# Patient Record
Sex: FEMALE | Race: WHITE
[De-identification: names, ages, dates, MRNs, and addresses within clinical notes are randomized per-mention and may not be internally consistent; named-entity substitution may affect disease eponyms.]

---

## 2017-04-13 ENCOUNTER — HOSPITAL ENCOUNTER (OUTPATIENT)
Dept: HOSPITAL 8 - CFH | Age: 82
Discharge: HOME | End: 2017-04-13
Attending: PAIN MEDICINE
Payer: MEDICARE

## 2017-04-13 DIAGNOSIS — M51.27: ICD-10-CM

## 2017-04-13 DIAGNOSIS — M51.36: Primary | ICD-10-CM

## 2017-04-13 DIAGNOSIS — D18.09: ICD-10-CM

## 2017-04-13 PROCEDURE — 72148 MRI LUMBAR SPINE W/O DYE: CPT

## 2021-01-14 DIAGNOSIS — Z23 NEED FOR VACCINATION: ICD-10-CM

## 2023-06-30 ENCOUNTER — OFFICE VISIT (OUTPATIENT)
Dept: URGENT CARE | Facility: PHYSICIAN GROUP | Age: 88
End: 2023-06-30
Payer: MEDICARE

## 2023-06-30 ENCOUNTER — HOSPITAL ENCOUNTER (OUTPATIENT)
Facility: MEDICAL CENTER | Age: 88
End: 2023-06-30
Attending: PHYSICIAN ASSISTANT
Payer: MEDICARE

## 2023-06-30 VITALS
WEIGHT: 144.2 LBS | TEMPERATURE: 97.3 F | DIASTOLIC BLOOD PRESSURE: 66 MMHG | RESPIRATION RATE: 16 BRPM | SYSTOLIC BLOOD PRESSURE: 126 MMHG | OXYGEN SATURATION: 95 % | HEART RATE: 82 BPM | BODY MASS INDEX: 23.18 KG/M2 | HEIGHT: 66 IN

## 2023-06-30 DIAGNOSIS — N30.00 ACUTE CYSTITIS WITHOUT HEMATURIA: ICD-10-CM

## 2023-06-30 LAB
APPEARANCE UR: CLEAR
BILIRUB UR STRIP-MCNC: NORMAL MG/DL
COLOR UR AUTO: YELLOW
GLUCOSE UR STRIP.AUTO-MCNC: NORMAL MG/DL
KETONES UR STRIP.AUTO-MCNC: NORMAL MG/DL
LEUKOCYTE ESTERASE UR QL STRIP.AUTO: NORMAL
NITRITE UR QL STRIP.AUTO: POSITIVE
PH UR STRIP.AUTO: 5 [PH] (ref 5–8)
PROT UR QL STRIP: NORMAL MG/DL
RBC UR QL AUTO: NORMAL
SP GR UR STRIP.AUTO: 1.01
UROBILINOGEN UR STRIP-MCNC: 0.2 MG/DL

## 2023-06-30 PROCEDURE — 3078F DIAST BP <80 MM HG: CPT | Performed by: PHYSICIAN ASSISTANT

## 2023-06-30 PROCEDURE — 99203 OFFICE O/P NEW LOW 30 MIN: CPT | Performed by: PHYSICIAN ASSISTANT

## 2023-06-30 PROCEDURE — 3074F SYST BP LT 130 MM HG: CPT | Performed by: PHYSICIAN ASSISTANT

## 2023-06-30 PROCEDURE — 87086 URINE CULTURE/COLONY COUNT: CPT

## 2023-06-30 PROCEDURE — 81002 URINALYSIS NONAUTO W/O SCOPE: CPT | Performed by: PHYSICIAN ASSISTANT

## 2023-06-30 PROCEDURE — 87186 SC STD MICRODIL/AGAR DIL: CPT

## 2023-06-30 PROCEDURE — 87077 CULTURE AEROBIC IDENTIFY: CPT

## 2023-06-30 RX ORDER — CEFDINIR 300 MG/1
300 CAPSULE ORAL 2 TIMES DAILY
Qty: 10 CAPSULE | Refills: 0 | Status: SHIPPED
Start: 2023-06-30 | End: 2023-07-05

## 2023-06-30 ASSESSMENT — ENCOUNTER SYMPTOMS
SHORTNESS OF BREATH: 0
COUGH: 0
NAUSEA: 0
VOMITING: 0
FLANK PAIN: 0
BACK PAIN: 0
FEVER: 0
CHILLS: 0
HEADACHES: 0

## 2023-06-30 NOTE — PROGRESS NOTES
"Subjective     Deborah Odonnell is a 89 y.o. female who presents with Painful Urination (X1 day, burning sensation when peeing and when not )            Dysuria   This is a new problem. The current episode started yesterday. The problem occurs every urination. The problem has been unchanged. The quality of the pain is described as burning. The pain is moderate. There has been no fever. Associated symptoms include frequency and urgency. Pertinent negatives include no chills, discharge, flank pain, hematuria, hesitancy, nausea or vomiting. She has tried nothing for the symptoms. There is no history of recurrent UTIs.       Past Medical History:   Diagnosis Date    CHEST PAIN 5/3/2011    Esophageal reflux disease 4/28/2011    Hypothyroidism 5/3/2011    Palpitations 5/25/2011    Post-menopause on HRT (hormone replacement therapy)     SOB (shortness of breath) 5/3/2011    Thyroid disease        Past Surgical History:   Procedure Laterality Date    PTOSIS REPAIR  1/22/2013    Performed by Clement Elliott M.D. at [facility]         Family History   Problem Relation Age of Onset    Heart Attack Sister        Allergies  Tape      Medications, Allergies, and current problem list reviewed today in Epic      Review of Systems   Constitutional:  Negative for chills, fever and malaise/fatigue.   Respiratory:  Negative for cough and shortness of breath.    Cardiovascular:  Negative for chest pain and leg swelling.   Gastrointestinal:  Negative for nausea and vomiting.   Genitourinary:  Positive for dysuria, frequency and urgency. Negative for flank pain, hematuria and hesitancy.   Musculoskeletal:  Negative for back pain.   Neurological:  Negative for headaches.          All other systems reviewed and are negative.         Objective     /66 (BP Location: Left arm, Patient Position: Sitting, BP Cuff Size: Adult)   Pulse 82   Temp 36.3 °C (97.3 °F) (Temporal)   Resp 16   Ht 1.676 m (5' 6\")   Wt 65.4 kg " (144 lb 3.2 oz)   SpO2 95%   BMI 23.27 kg/m²      Physical Exam  Constitutional:       General: She is not in acute distress.     Appearance: She is not ill-appearing.   HENT:      Head: Normocephalic and atraumatic.   Eyes:      Conjunctiva/sclera: Conjunctivae normal.   Cardiovascular:      Rate and Rhythm: Normal rate and regular rhythm.   Pulmonary:      Effort: Pulmonary effort is normal. No respiratory distress.      Breath sounds: No stridor. No wheezing.   Abdominal:      General: There is no distension.      Palpations: Abdomen is soft. There is no mass.      Tenderness: There is abdominal tenderness (mild suprapubic TTP). There is no right CVA tenderness, left CVA tenderness, guarding or rebound.   Skin:     General: Skin is warm and dry.   Neurological:      General: No focal deficit present.      Mental Status: She is alert and oriented to person, place, and time.   Psychiatric:         Mood and Affect: Mood normal.         Behavior: Behavior normal.         Thought Content: Thought content normal.         Judgment: Judgment normal.               UA: positive leuks, positive nitrates, trace blood                 Assessment & Plan        1. Acute cystitis without hematuria  POCT Urinalysis    URINE CULTURE(NEW)    cefdinir (OMNICEF) 300 MG Cap             Current Outpatient Medications:     cefdinir (OMNICEF) 300 MG Cap, Take 1 Capsule by mouth 2 times a day for 5 days., Disp: 10 Capsule, Rfl: 0        Differential diagnoses, Supportive care, and indications for immediate follow-up discussed with patient.   Pathogenesis of diagnosis discussed including typical length and natural progression.   Instructed to return to clinic or nearest emergency department for any change in condition, further concerns, or worsening of symptoms.        The patient demonstrated a good understanding and agreed with the treatment plan.      Maria Fernanda Younger P.A.-C.

## 2023-07-02 DIAGNOSIS — N30.00 ACUTE CYSTITIS WITHOUT HEMATURIA: ICD-10-CM

## 2023-07-02 LAB
BACTERIA UR CULT: ABNORMAL
BACTERIA UR CULT: ABNORMAL
SIGNIFICANT IND 70042: ABNORMAL
SITE SITE: ABNORMAL
SOURCE SOURCE: ABNORMAL

## 2023-07-02 RX ORDER — GRANULES FOR ORAL 3 G/1
3 POWDER ORAL ONCE
Qty: 1 EACH | Refills: 0 | Status: SHIPPED | OUTPATIENT
Start: 2023-07-02 | End: 2023-07-02

## 2023-07-02 NOTE — PROGRESS NOTES
Discussed urine culture with patient. Advised her to stop Cefdinir. Will send in Fosfomycin for ESBL found in urine culture.    The patient demonstrated a good understanding and agreed with the treatment plan.      Maria Fernanda Younger P.A.-C.

## 2023-07-20 ENCOUNTER — APPOINTMENT (OUTPATIENT)
Dept: URGENT CARE | Facility: PHYSICIAN GROUP | Age: 88
End: 2023-07-20
Payer: MEDICARE

## 2023-07-20 ENCOUNTER — HOSPITAL ENCOUNTER (OUTPATIENT)
Facility: MEDICAL CENTER | Age: 88
End: 2023-07-20
Attending: NURSE PRACTITIONER
Payer: MEDICARE

## 2023-07-20 ENCOUNTER — OFFICE VISIT (OUTPATIENT)
Dept: URGENT CARE | Facility: PHYSICIAN GROUP | Age: 88
End: 2023-07-20
Payer: MEDICARE

## 2023-07-20 VITALS
HEART RATE: 95 BPM | TEMPERATURE: 98.4 F | BODY MASS INDEX: 23.14 KG/M2 | OXYGEN SATURATION: 95 % | RESPIRATION RATE: 16 BRPM | HEIGHT: 66 IN | WEIGHT: 144 LBS | SYSTOLIC BLOOD PRESSURE: 102 MMHG | DIASTOLIC BLOOD PRESSURE: 64 MMHG

## 2023-07-20 DIAGNOSIS — N39.0 UTI DUE TO EXTENDED-SPECTRUM BETA LACTAMASE (ESBL) PRODUCING ESCHERICHIA COLI: ICD-10-CM

## 2023-07-20 DIAGNOSIS — Z16.12 UTI DUE TO EXTENDED-SPECTRUM BETA LACTAMASE (ESBL) PRODUCING ESCHERICHIA COLI: ICD-10-CM

## 2023-07-20 DIAGNOSIS — B96.29 UTI DUE TO EXTENDED-SPECTRUM BETA LACTAMASE (ESBL) PRODUCING ESCHERICHIA COLI: ICD-10-CM

## 2023-07-20 DIAGNOSIS — R39.9 LOWER URINARY TRACT SYMPTOMS: ICD-10-CM

## 2023-07-20 LAB
APPEARANCE UR: CLEAR
BILIRUB UR STRIP-MCNC: NEGATIVE MG/DL
COLOR UR AUTO: YELLOW
GLUCOSE UR STRIP.AUTO-MCNC: NEGATIVE MG/DL
KETONES UR STRIP.AUTO-MCNC: NEGATIVE MG/DL
LEUKOCYTE ESTERASE UR QL STRIP.AUTO: NORMAL
NITRITE UR QL STRIP.AUTO: NEGATIVE
PH UR STRIP.AUTO: 5.5 [PH] (ref 5–8)
PROT UR QL STRIP: NEGATIVE MG/DL
RBC UR QL AUTO: NORMAL
SP GR UR STRIP.AUTO: 1
UROBILINOGEN UR STRIP-MCNC: 0.2 MG/DL

## 2023-07-20 PROCEDURE — 99214 OFFICE O/P EST MOD 30 MIN: CPT | Performed by: NURSE PRACTITIONER

## 2023-07-20 PROCEDURE — 3074F SYST BP LT 130 MM HG: CPT | Performed by: NURSE PRACTITIONER

## 2023-07-20 PROCEDURE — 87086 URINE CULTURE/COLONY COUNT: CPT

## 2023-07-20 PROCEDURE — 81002 URINALYSIS NONAUTO W/O SCOPE: CPT | Performed by: NURSE PRACTITIONER

## 2023-07-20 PROCEDURE — 87077 CULTURE AEROBIC IDENTIFY: CPT

## 2023-07-20 PROCEDURE — 87186 SC STD MICRODIL/AGAR DIL: CPT

## 2023-07-20 PROCEDURE — 3078F DIAST BP <80 MM HG: CPT | Performed by: NURSE PRACTITIONER

## 2023-07-20 RX ORDER — SULFAMETHOXAZOLE AND TRIMETHOPRIM 800; 160 MG/1; MG/1
1 TABLET ORAL 2 TIMES DAILY
Qty: 10 TABLET | Refills: 0 | Status: SHIPPED | OUTPATIENT
Start: 2023-07-20 | End: 2023-07-25

## 2023-07-20 ASSESSMENT — ENCOUNTER SYMPTOMS
CHILLS: 0
VOMITING: 0
ORTHOPNEA: 0
HEADACHES: 0
NAUSEA: 0
DIZZINESS: 0
DIARRHEA: 0
FLANK PAIN: 0
BACK PAIN: 0
ABDOMINAL PAIN: 1
FEVER: 0

## 2023-07-20 NOTE — PROGRESS NOTES
Subjective     Deborah Odonnell is a 89 y.o. female who presents with UTI (Started yesterday )            HPI  Recurrent problem.  Patient is an 89-year-old female who presents with lower abdominal discomfort.  She reports that she was treated for UTI at the end of June and feels like it has never completely gone away but she has continued to have some lower abdominal pressure and pain.  She denies back pain, fever, chills.  She denies blood in her urine.  She had a culture that grew out ESBL E. coli and was changed from cefdinir to fosfomycin.    Tape  Current Outpatient Medications on File Prior to Visit   Medication Sig Dispense Refill    levothyroxine (SYNTHROID) 125 MCG TABS Take 125 mcg by mouth every day.      methylPREDNISolone (MEDROL DOSPACK) 4 MG TABS Take 4 mg by mouth every day. (Patient not taking: Reported on 6/30/2023)      Estradiol 0.025 MG/24HR PTTW Apply 1 Patch to skin as directed 2 times a week. (Patient not taking: Reported on 7/20/2023)      tramadol (ULTRAM) 50 MG TABS Take 1 Tab by mouth every 6 hours as needed for Mild Pain or Moderate Pain. (Patient not taking: Reported on 6/30/2023) 25 Tab 0    KRILL OIL PO Take  by mouth. (Patient not taking: Reported on 6/30/2023)      Biotin 5 MG TABS Take  by mouth. (Patient not taking: Reported on 6/30/2023)      Glucos-Chond-Sterol-Fish Oil (GLUCOSAMINE CHONDROITIN PLUS) CAPS Take  by mouth. (Patient not taking: Reported on 6/30/2023)      LUTEIN PO Take  by mouth. (Patient not taking: Reported on 6/30/2023)      Cholecalciferol (VITAMIN D3) 5000 UNITS TABS Take  by mouth. (Patient not taking: Reported on 6/30/2023)      magnesium citrate SOLN Take 300 mL by mouth Once. (Patient not taking: Reported on 6/30/2023)      Estradiol Valerate (DELESTROGEN) 20 MG/ML OIL 1 mL by Intramuscular route. Every 4 weeks.  (Patient not taking: Reported on 6/30/2023)      folic acid (FOLVITE) 800 MCG tablet Take  by mouth. Take 4 tablets daily.  (Patient not taking:  "Reported on 6/30/2023)      Cyanocobalamin (VITAMIN B-12) 1000 MCG TABS Take 1,000 mcg by mouth every day. Take 3 tablets (3000 mg) daily.  (Patient not taking: Reported on 6/30/2023)      Multiple Vitamin (MULTIVITAMINS PO) Take  by mouth. (Patient not taking: Reported on 7/20/2023)       No current facility-administered medications on file prior to visit.     Social History     Socioeconomic History    Marital status:      Spouse name: Not on file    Number of children: Not on file    Years of education: Not on file    Highest education level: Not on file   Occupational History    Not on file   Tobacco Use    Smoking status: Never    Smokeless tobacco: Not on file   Substance and Sexual Activity    Alcohol use: No    Drug use: No    Sexual activity: Not on file   Other Topics Concern    Not on file   Social History Narrative    Not on file     Social Determinants of Health     Financial Resource Strain: Not on file   Food Insecurity: Not on file   Transportation Needs: Not on file   Physical Activity: Not on file   Stress: Not on file   Social Connections: Not on file   Intimate Partner Violence: Not on file   Housing Stability: Not on file     Breast Cancer-related family history is not on file.      Review of Systems   Constitutional:  Negative for chills and fever.   Cardiovascular:  Negative for chest pain and orthopnea.   Gastrointestinal:  Positive for abdominal pain. Negative for diarrhea, nausea and vomiting.   Genitourinary:  Negative for dysuria, flank pain, frequency, hematuria and urgency.   Musculoskeletal:  Negative for back pain.   Neurological:  Negative for dizziness and headaches.              Objective     /64 (BP Location: Right arm, Patient Position: Sitting, BP Cuff Size: Adult)   Pulse 95   Temp 36.9 °C (98.4 °F) (Temporal)   Resp 16   Ht 1.676 m (5' 6\")   Wt 65.3 kg (144 lb)   SpO2 95%   BMI 23.24 kg/m²      Physical Exam  Vitals reviewed.   Constitutional:       " General: She is not in acute distress.     Appearance: She is well-developed.   Cardiovascular:      Rate and Rhythm: Normal rate and regular rhythm.      Heart sounds: Normal heart sounds. No murmur heard.  Pulmonary:      Effort: Pulmonary effort is normal. No respiratory distress.      Breath sounds: Normal breath sounds.   Abdominal:      General: Bowel sounds are normal.      Palpations: Abdomen is soft.      Tenderness: There is abdominal tenderness in the suprapubic area. There is no right CVA tenderness or left CVA tenderness.   Musculoskeletal:         General: Normal range of motion.      Comments: Moves all 4 extremities normally   Skin:     General: Skin is warm and dry.   Neurological:      Mental Status: She is alert and oriented to person, place, and time.   Psychiatric:         Behavior: Behavior normal.         Thought Content: Thought content normal.                             Assessment & Plan        1. UTI due to extended-spectrum beta lactamase (ESBL) producing Escherichia coli  sulfamethoxazole-trimethoprim (BACTRIM DS) 800-160 MG tablet      2. Lower urinary tract symptoms  POCT Urinalysis    URINE CULTURE(NEW)      Will call with culture results.  Bactrim.  Differential diagnosis, natural history, supportive care, and indications for immediate follow-up were discussed.

## 2023-07-21 DIAGNOSIS — R39.9 LOWER URINARY TRACT SYMPTOMS: ICD-10-CM

## 2023-08-02 ENCOUNTER — OFFICE VISIT (OUTPATIENT)
Dept: URGENT CARE | Facility: PHYSICIAN GROUP | Age: 88
End: 2023-08-02
Payer: MEDICARE

## 2023-08-02 ENCOUNTER — HOSPITAL ENCOUNTER (OUTPATIENT)
Facility: MEDICAL CENTER | Age: 88
End: 2023-08-02
Payer: MEDICARE

## 2023-08-02 VITALS
BODY MASS INDEX: 23.49 KG/M2 | OXYGEN SATURATION: 94 % | DIASTOLIC BLOOD PRESSURE: 52 MMHG | HEART RATE: 87 BPM | HEIGHT: 65 IN | RESPIRATION RATE: 18 BRPM | WEIGHT: 141 LBS | SYSTOLIC BLOOD PRESSURE: 134 MMHG | TEMPERATURE: 97.6 F

## 2023-08-02 DIAGNOSIS — N30.01 ACUTE CYSTITIS WITH HEMATURIA: ICD-10-CM

## 2023-08-02 LAB
APPEARANCE UR: NORMAL
BILIRUB UR STRIP-MCNC: NORMAL MG/DL
COLOR UR AUTO: NORMAL
GLUCOSE UR STRIP.AUTO-MCNC: 250 MG/DL
KETONES UR STRIP.AUTO-MCNC: 15 MG/DL
LEUKOCYTE ESTERASE UR QL STRIP.AUTO: NORMAL
NITRITE UR QL STRIP.AUTO: POSITIVE
PH UR STRIP.AUTO: 5 [PH] (ref 5–8)
PROT UR QL STRIP: 300 MG/DL
RBC UR QL AUTO: NORMAL
SP GR UR STRIP.AUTO: 1
UROBILINOGEN UR STRIP-MCNC: 4 MG/DL

## 2023-08-02 PROCEDURE — 87077 CULTURE AEROBIC IDENTIFY: CPT | Mod: 91

## 2023-08-02 PROCEDURE — 3075F SYST BP GE 130 - 139MM HG: CPT

## 2023-08-02 PROCEDURE — 87186 SC STD MICRODIL/AGAR DIL: CPT

## 2023-08-02 PROCEDURE — 99214 OFFICE O/P EST MOD 30 MIN: CPT

## 2023-08-02 PROCEDURE — 81002 URINALYSIS NONAUTO W/O SCOPE: CPT

## 2023-08-02 PROCEDURE — 3078F DIAST BP <80 MM HG: CPT

## 2023-08-02 PROCEDURE — 87086 URINE CULTURE/COLONY COUNT: CPT

## 2023-08-02 RX ORDER — SULFAMETHOXAZOLE AND TRIMETHOPRIM 800; 160 MG/1; MG/1
1 TABLET ORAL 2 TIMES DAILY
Qty: 14 TABLET | Refills: 0 | Status: SHIPPED | OUTPATIENT
Start: 2023-08-02 | End: 2023-08-09

## 2023-08-03 DIAGNOSIS — N30.01 ACUTE CYSTITIS WITH HEMATURIA: ICD-10-CM

## 2023-08-03 NOTE — PROGRESS NOTES
"Chief Complaint   Patient presents with    UTI     Burning,x2 days         Subjective:   HISTORY OF PRESENT ILLNESS: Deborah Odonnell is a 89 y.o. female who presents for dysuria x 2 days, this appears to be her 3rd UTI in less than 2 months.  It is unclear whether her symptoms had fully resolved between each episode.  Her last culture grew out Klebsiella pneumonia which was sensitive to Bactrim which she was placed on.  She reports this multiple UTI's has happened to her in the past and she used an estrogen vaginal cream and it lessened her episodes.       Medications, Allergies, current problem list, Social and Family history reviewed today in Epic.     Objective:     /52 (BP Location: Right arm, Patient Position: Sitting, BP Cuff Size: Adult)   Pulse 87   Temp 36.4 °C (97.6 °F) (Temporal)   Resp 18   Ht 1.651 m (5' 5\")   Wt 64 kg (141 lb)   SpO2 94%     Physical Exam  Vitals reviewed.   Constitutional:       Appearance: Normal appearance.   HENT:      Mouth/Throat:      Mouth: Mucous membranes are moist.   Cardiovascular:      Rate and Rhythm: Normal rate.   Pulmonary:      Effort: Pulmonary effort is normal.   Abdominal:      Tenderness: There is no abdominal tenderness. There is no right CVA tenderness, left CVA tenderness, guarding or rebound.   Skin:     General: Skin is warm and dry.   Neurological:      Mental Status: She is alert and oriented to person, place, and time.   Psychiatric:         Mood and Affect: Mood normal.          Assessment/Plan:     Diagnosis and associated orders    1. Acute cystitis with hematuria  sulfamethoxazole-trimethoprim (BACTRIM DS) 800-160 MG tablet    estrogens, conjugated (Premarin) 0.625 MG/GM vaginal cream 1 Applicator    POCT Urinalysis    URINE CULTURE(NEW)    Referral to Urogynecology          I personally reviewed prior external notes and test results pertinent to today's visit.       IMPRESSION: Patient is non-toxic appearing and suitable for outpatient " care at this time.  Symptoms and exam are consistent with re-current UTI.   Exam findings reassuring with no flank pain, n/v, fevers.  We will treat with bactrim and culture her urine.  I have given her some vaginal estrogen cream and a referral to uro-gyn to evaluate her symptoms    Educated on red flag symptoms and Instructed patient to return to Urgent Care or nearest Emergency Department if symptoms fail to improve, for any change in condition, further concerns, or new concerning symptoms. Patient states understanding of the plan of care and discharge instructions.        Please note that this dictation was created using voice recognition software. I have made a reasonable attempt to correct obvious errors, but I expect that there are errors of grammar and possibly content that I did not discover before finalizing the note.    This note was electronically signed by CONSTANZA Oneill

## 2023-08-05 LAB
BACTERIA UR CULT: ABNORMAL
SIGNIFICANT IND 70042: ABNORMAL
SITE SITE: ABNORMAL
SOURCE SOURCE: ABNORMAL

## 2023-08-05 RX ORDER — CEFUROXIME AXETIL 250 MG/1
250 TABLET ORAL 2 TIMES DAILY
Qty: 14 TABLET | Refills: 0 | Status: SHIPPED | OUTPATIENT
Start: 2023-08-05 | End: 2023-08-12

## 2023-08-28 ENCOUNTER — HOSPITAL ENCOUNTER (OUTPATIENT)
Facility: MEDICAL CENTER | Age: 88
End: 2023-08-28
Attending: OBSTETRICS & GYNECOLOGY
Payer: MEDICARE

## 2023-08-28 ENCOUNTER — GYNECOLOGY VISIT (OUTPATIENT)
Dept: OBGYN | Facility: CLINIC | Age: 88
End: 2023-08-28
Payer: MEDICARE

## 2023-08-28 VITALS — BODY MASS INDEX: 23.45 KG/M2 | SYSTOLIC BLOOD PRESSURE: 116 MMHG | DIASTOLIC BLOOD PRESSURE: 58 MMHG | WEIGHT: 140.9 LBS

## 2023-08-28 DIAGNOSIS — N39.0 RECURRENT UTI: ICD-10-CM

## 2023-08-28 DIAGNOSIS — N39.0 RECURRENT UTI: Primary | ICD-10-CM

## 2023-08-28 LAB
APPEARANCE UR: NORMAL
BILIRUB UR STRIP-MCNC: NORMAL MG/DL
COLOR UR AUTO: NORMAL
GLUCOSE UR STRIP.AUTO-MCNC: 100 MG/DL
KETONES UR STRIP.AUTO-MCNC: NORMAL MG/DL
LEUKOCYTE ESTERASE UR QL STRIP.AUTO: NORMAL
NITRITE UR QL STRIP.AUTO: NORMAL
PH UR STRIP.AUTO: 5.5 [PH] (ref 5–8)
PROT UR QL STRIP: NORMAL MG/DL
RBC UR QL AUTO: NORMAL
SP GR UR STRIP.AUTO: 1.01
UROBILINOGEN UR STRIP-MCNC: 1 MG/DL

## 2023-08-28 PROCEDURE — 87086 URINE CULTURE/COLONY COUNT: CPT

## 2023-08-28 PROCEDURE — 99204 OFFICE O/P NEW MOD 45 MIN: CPT | Performed by: OBSTETRICS & GYNECOLOGY

## 2023-08-28 PROCEDURE — 87186 SC STD MICRODIL/AGAR DIL: CPT

## 2023-08-28 PROCEDURE — 3078F DIAST BP <80 MM HG: CPT | Performed by: OBSTETRICS & GYNECOLOGY

## 2023-08-28 PROCEDURE — 81002 URINALYSIS NONAUTO W/O SCOPE: CPT | Performed by: OBSTETRICS & GYNECOLOGY

## 2023-08-28 PROCEDURE — 87077 CULTURE AEROBIC IDENTIFY: CPT

## 2023-08-28 PROCEDURE — 3074F SYST BP LT 130 MM HG: CPT | Performed by: OBSTETRICS & GYNECOLOGY

## 2023-08-28 RX ORDER — ESTRADIOL 0.03 MG/D
1 FILM, EXTENDED RELEASE TRANSDERMAL
COMMUNITY

## 2023-08-28 RX ORDER — ESTRADIOL 0.1 MG/G
CREAM VAGINAL
Qty: 42.5 G | Refills: 2 | Status: SHIPPED | OUTPATIENT
Start: 2023-08-28

## 2023-08-28 RX ORDER — LEVOTHYROXINE SODIUM 0.1 MG/1
100 TABLET ORAL
COMMUNITY

## 2023-08-28 RX ORDER — SULFAMETHOXAZOLE AND TRIMETHOPRIM 800; 160 MG/1; MG/1
1 TABLET ORAL 2 TIMES DAILY
Qty: 20 TABLET | Refills: 0 | Status: SHIPPED | OUTPATIENT
Start: 2023-08-28 | End: 2023-09-07

## 2023-08-28 NOTE — PROGRESS NOTES
New GYN Problem Note    CC:   Cystitis (Ongoing for x2 months, tried Fosmycin, cefuroxine, cefdinir, sulfameth. With no resolution.)      HPI:   Patient is a 89 y.o.  who presents complaining of recurrent UTIs over the past two months. She recently moved back to [place] so has just been going to Urgent Care and I was the first provider she was able to get in with.  Since  she has had 3 positive urine cultures at Urgent care with 3 different isolates  - ESBL 1st, Klebsiella 2nd and Klebsiella + Strep anginosus the 3rd time.  She has taken bactrim, fosamycin, cefuroxime  and cefdinir and completed each treatment. She states it will feel like its getting better but then returns immediately after stopping medication. She has not changed her hygiene practices and states she washes with water after any bowel movement.  She is still on an estradiol patch which she oddly has been all these years despite age after being told that she was unusually estrogen deficient after hysterectomy at age 27 (reportedly without BSO).  She has used premarin cream in the past when she had a similar bout of UTIs and was given this by a urologist but it is so expensive she stopped use.        GYNECOLOGIC HISTORY:   Abnormal discharge? No   Hysterectomy: age 27  Denies osteoporosis       Sexually active:    Social History     Substance and Sexual Activity   Sexual Activity Not Currently       OBSTETRIC HISTORY:  OB History    Para Term  AB Living   3 3 2 1       SAB IAB Ectopic Molar Multiple Live Births                    # Outcome Date GA Lbr Flaco/2nd Weight Sex Delivery Anes PTL Lv   3 Term 68 40w0d   M Vag-Spont      2 Term 67 40w0d   F Vag-Spont      1  65 36w0d   F Vag-Spont          MEDICAL HISTORY:  Past Medical History:   Diagnosis Date    CHEST PAIN 5/3/2011    Esophageal reflux disease 2011    Hypothyroidism 5/3/2011    Palpitations 2011    Post-menopause on HRT (hormone  replacement therapy)     SOB (shortness of breath) 5/3/2011    Thyroid disease        SURGICAL HISTORY:  Past Surgical History:   Procedure Laterality Date    PTOSIS REPAIR  2013    Performed by Clement Elliott M.D. at [facility]       FAMILY HISTORY:  Family History   Problem Relation Age of Onset    Heart Attack Sister        ALLERGIES / REACTIONS:  Allergies   Allergen Reactions    Tape        SOCIAL HISTORY:   reports that she has never smoked. She has never been exposed to tobacco smoke. She has never used smokeless tobacco. She reports that she does not drink alcohol and does not use drugs.    ROS:   Positive ROS: suprapubic pain  Gen: no fevers or chills, no significant weight loss or gain, excessive fatigue  Respiratory:  no cough or dyspnea  Cardiac:  no chest pain, no palpitations, no syncope  Breast: no breast discharge, pain, lump or skin changes  GI:  no heartburn, no abdominal pain, no nausea or vomiting  Urinary: no dysuria, urgency, frequency, incontinence   Psych: no depression or anxiety  Neuro: no migraines with aura, fainting spells, numbness or tingling  Extremities: no joint pain, persistently swollen ankles, recurrent leg cramps       PHYSICAL EXAMINATION:  Vital Signs:   Vitals:    23 1046   BP: 116/58   Weight: 140 lb 14.4 oz     Body mass index is 23.45 kg/m².  Constitutional: The patient is well developed and well nourished.  Psychiatric: Patient is oriented to time place and person.   Skin: No rash observed.  Neck: Neck appears symmetric.  Respiratory: normal effort  Abdomen: Soft, non-tender.  Extremeties: Legs are symmetric     ASSESSMENT AND PLAN:  89 y.o.       1. Recurrent UTI   - discussed that from gyn perspective, local estrogen cream can help with recurrent UTIs.  Also discussed that is is very abnormal for her to be on estrogen patch at this age but this seems her norm so I did not really discourage use or address this given she is 89 and has  used it without complications to this age   - discussed that estrace cream is sometimes cheaper/better covered than premarin   - may use cream daily x 2wks and then x week     - UA with +nit   - will send for culture   - review of past 3 cultures show 3 different organisms each sensitive to 2 of the 3 most commonly used abx and resistant to 1 primary antibiotic each/  She has been treated appropriately each time so why she keeps getting a new infection is unclear to me.  I will go ahead and start extended use of bactrim which covers all but 1 of the recent bacteria and await this next culture and switch if resistant.     - refer to urology for further evaluation and treatment as beyond local estrogen therapy this is out of my scope due to complicated UTIs due to bacteria and age and risk for severe illness    - estradiol (ESTRACE) 0.1 MG/GM vaginal cream; Use 1g per vagina nightly x 2 weeks then continue use 2x per week  Dispense: 42.5 g; Refill: 2  - URINE CULTURE(NEW); Future  - sulfamethoxazole-trimethoprim (BACTRIM DS) 800-160 MG tablet; Take 1 Tablet by mouth 2 times a day for 10 days.  Dispense: 20 Tablet; Refill: 0  - Referral to Urology - STAT        Sowmya Naqvi D.O.

## 2023-08-31 ENCOUNTER — TELEPHONE (OUTPATIENT)
Dept: OBGYN | Facility: CLINIC | Age: 88
End: 2023-08-31
Payer: MEDICARE

## 2023-08-31 DIAGNOSIS — E03.9 HYPOTHYROIDISM, UNSPECIFIED TYPE: ICD-10-CM

## 2023-08-31 DIAGNOSIS — N39.0 COMPLICATED UTI (URINARY TRACT INFECTION): Primary | ICD-10-CM

## 2023-08-31 RX ORDER — AMOXICILLIN AND CLAVULANATE POTASSIUM 875; 125 MG/1; MG/1
1 TABLET, FILM COATED ORAL 2 TIMES DAILY
Qty: 20 TABLET | Refills: 0 | Status: SHIPPED | OUTPATIENT
Start: 2023-08-31 | End: 2023-09-10

## 2023-09-01 ENCOUNTER — HOSPITAL ENCOUNTER (OUTPATIENT)
Dept: LAB | Facility: MEDICAL CENTER | Age: 88
End: 2023-09-01
Attending: OBSTETRICS & GYNECOLOGY
Payer: MEDICARE

## 2023-09-01 DIAGNOSIS — E03.9 HYPOTHYROIDISM, UNSPECIFIED TYPE: ICD-10-CM

## 2023-09-01 DIAGNOSIS — N39.0 COMPLICATED UTI (URINARY TRACT INFECTION): ICD-10-CM

## 2023-09-01 LAB
ANION GAP SERPL CALC-SCNC: 9 MMOL/L (ref 7–16)
BUN SERPL-MCNC: 18 MG/DL (ref 8–22)
CALCIUM SERPL-MCNC: 9.4 MG/DL (ref 8.5–10.5)
CHLORIDE SERPL-SCNC: 104 MMOL/L (ref 96–112)
CO2 SERPL-SCNC: 24 MMOL/L (ref 20–33)
CREAT SERPL-MCNC: 1.33 MG/DL (ref 0.5–1.4)
GFR SERPLBLD CREATININE-BSD FMLA CKD-EPI: 38 ML/MIN/1.73 M 2
GLUCOSE SERPL-MCNC: 99 MG/DL (ref 65–99)
POTASSIUM SERPL-SCNC: 4.8 MMOL/L (ref 3.6–5.5)
SODIUM SERPL-SCNC: 137 MMOL/L (ref 135–145)
T4 FREE SERPL-MCNC: 1.66 NG/DL (ref 0.93–1.7)
TSH SERPL DL<=0.005 MIU/L-ACNC: 0.54 UIU/ML (ref 0.38–5.33)

## 2023-09-01 PROCEDURE — 84439 ASSAY OF FREE THYROXINE: CPT

## 2023-09-01 PROCEDURE — 84443 ASSAY THYROID STIM HORMONE: CPT

## 2023-09-01 PROCEDURE — 80048 BASIC METABOLIC PNL TOTAL CA: CPT

## 2023-09-01 PROCEDURE — 36415 COLL VENOUS BLD VENIPUNCTURE: CPT

## 2023-09-01 NOTE — TELEPHONE ENCOUNTER
Called pt to inform her of next urine culture growing out 1 of 2 same as last urine culture of strep anginosus.  Discussed with micro as sensitivities not sent and would be send out lab.  Also not typical for uti.  Discussed with clinical pharmacist and they recommend penicillin based treatment.  We agreed upon pt stopping bactrim and starting Augmentin.  She has made urology appt but pushed it off to see if UTI with clear with the 2 wks antibiotic I gave her.  She felt better yesterday but has lower abd pain today.    Discussed referral to ID for them to review as well.    Will obtain BMP to check kidney function and pt also states she needs thyroid labs checked.      Orders placed for labs and referral and new abx.

## 2023-11-10 ENCOUNTER — HOSPITAL ENCOUNTER (OUTPATIENT)
Facility: MEDICAL CENTER | Age: 88
End: 2023-11-10
Attending: PHYSICIAN ASSISTANT
Payer: MEDICARE

## 2023-11-10 PROCEDURE — 87077 CULTURE AEROBIC IDENTIFY: CPT

## 2023-11-10 PROCEDURE — 87086 URINE CULTURE/COLONY COUNT: CPT

## 2023-11-12 LAB
BACTERIA UR CULT: NORMAL
SIGNIFICANT IND 70042: NORMAL
SITE SITE: NORMAL
SOURCE SOURCE: NORMAL

## 2023-11-15 ENCOUNTER — HOSPITAL ENCOUNTER (OUTPATIENT)
Dept: LAB | Facility: MEDICAL CENTER | Age: 88
End: 2023-11-15
Payer: MEDICARE

## 2023-11-15 LAB
25(OH)D3 SERPL-MCNC: 57 NG/ML (ref 30–100)
ALBUMIN SERPL BCP-MCNC: 3.9 G/DL (ref 3.2–4.9)
ALBUMIN/GLOB SERPL: 1.3 G/DL
ALP SERPL-CCNC: 78 U/L (ref 30–99)
ALT SERPL-CCNC: 13 U/L (ref 2–50)
ANION GAP SERPL CALC-SCNC: 10 MMOL/L (ref 7–16)
AST SERPL-CCNC: 23 U/L (ref 12–45)
BASOPHILS # BLD AUTO: 0.6 % (ref 0–1.8)
BASOPHILS # BLD: 0.04 K/UL (ref 0–0.12)
BILIRUB SERPL-MCNC: 0.5 MG/DL (ref 0.1–1.5)
BUN SERPL-MCNC: 20 MG/DL (ref 8–22)
CALCIUM ALBUM COR SERPL-MCNC: 9.7 MG/DL (ref 8.5–10.5)
CALCIUM SERPL-MCNC: 9.6 MG/DL (ref 8.5–10.5)
CHLORIDE SERPL-SCNC: 105 MMOL/L (ref 96–112)
CHOLEST SERPL-MCNC: 224 MG/DL (ref 100–199)
CO2 SERPL-SCNC: 25 MMOL/L (ref 20–33)
CREAT SERPL-MCNC: 1.01 MG/DL (ref 0.5–1.4)
EOSINOPHIL # BLD AUTO: 0.07 K/UL (ref 0–0.51)
EOSINOPHIL NFR BLD: 1 % (ref 0–6.9)
ERYTHROCYTE [DISTWIDTH] IN BLOOD BY AUTOMATED COUNT: 44.2 FL (ref 35.9–50)
GFR SERPLBLD CREATININE-BSD FMLA CKD-EPI: 53 ML/MIN/1.73 M 2
GLOBULIN SER CALC-MCNC: 3 G/DL (ref 1.9–3.5)
GLUCOSE SERPL-MCNC: 97 MG/DL (ref 65–99)
HCT VFR BLD AUTO: 47 % (ref 37–47)
HDLC SERPL-MCNC: 73 MG/DL
HGB BLD-MCNC: 14.8 G/DL (ref 12–16)
IMM GRANULOCYTES # BLD AUTO: 0.01 K/UL (ref 0–0.11)
IMM GRANULOCYTES NFR BLD AUTO: 0.1 % (ref 0–0.9)
LDLC SERPL CALC-MCNC: 135 MG/DL
LYMPHOCYTES # BLD AUTO: 1.89 K/UL (ref 1–4.8)
LYMPHOCYTES NFR BLD: 27.2 % (ref 22–41)
MCH RBC QN AUTO: 28 PG (ref 27–33)
MCHC RBC AUTO-ENTMCNC: 31.5 G/DL (ref 32.2–35.5)
MCV RBC AUTO: 89 FL (ref 81.4–97.8)
MONOCYTES # BLD AUTO: 0.57 K/UL (ref 0–0.85)
MONOCYTES NFR BLD AUTO: 8.2 % (ref 0–13.4)
NEUTROPHILS # BLD AUTO: 4.36 K/UL (ref 1.82–7.42)
NEUTROPHILS NFR BLD: 62.9 % (ref 44–72)
NRBC # BLD AUTO: 0 K/UL
NRBC BLD-RTO: 0 /100 WBC (ref 0–0.2)
PLATELET # BLD AUTO: 328 K/UL (ref 164–446)
PMV BLD AUTO: 10.3 FL (ref 9–12.9)
POTASSIUM SERPL-SCNC: 4.6 MMOL/L (ref 3.6–5.5)
PROT SERPL-MCNC: 6.9 G/DL (ref 6–8.2)
RBC # BLD AUTO: 5.28 M/UL (ref 4.2–5.4)
SODIUM SERPL-SCNC: 140 MMOL/L (ref 135–145)
T3FREE SERPL-MCNC: 1.98 PG/ML (ref 2–4.4)
T4 FREE SERPL-MCNC: 1.29 NG/DL (ref 0.93–1.7)
TRIGL SERPL-MCNC: 78 MG/DL (ref 0–149)
TSH SERPL DL<=0.005 MIU/L-ACNC: 1.35 UIU/ML (ref 0.38–5.33)
URATE SERPL-MCNC: 5.7 MG/DL (ref 1.9–8.2)
WBC # BLD AUTO: 6.9 K/UL (ref 4.8–10.8)

## 2023-11-15 PROCEDURE — 84439 ASSAY OF FREE THYROXINE: CPT

## 2023-11-15 PROCEDURE — 80053 COMPREHEN METABOLIC PANEL: CPT

## 2023-11-15 PROCEDURE — 84443 ASSAY THYROID STIM HORMONE: CPT

## 2023-11-15 PROCEDURE — 82306 VITAMIN D 25 HYDROXY: CPT

## 2023-11-15 PROCEDURE — 36415 COLL VENOUS BLD VENIPUNCTURE: CPT

## 2023-11-15 PROCEDURE — 80061 LIPID PANEL: CPT

## 2023-11-15 PROCEDURE — 84481 FREE ASSAY (FT-3): CPT

## 2023-11-15 PROCEDURE — 86376 MICROSOMAL ANTIBODY EACH: CPT

## 2023-11-15 PROCEDURE — 84550 ASSAY OF BLOOD/URIC ACID: CPT

## 2023-11-15 PROCEDURE — 86800 THYROGLOBULIN ANTIBODY: CPT

## 2023-11-15 PROCEDURE — 85025 COMPLETE CBC W/AUTO DIFF WBC: CPT

## 2023-11-17 LAB
THYROGLOB AB SERPL-ACNC: <0.9 IU/ML (ref 0–4)
THYROPEROXIDASE AB SERPL-ACNC: 1.6 IU/ML (ref 0–9)

## 2024-07-03 ENCOUNTER — HOSPITAL ENCOUNTER (OUTPATIENT)
Facility: MEDICAL CENTER | Age: 89
End: 2024-07-03
Attending: PHYSICIAN ASSISTANT
Payer: MEDICARE

## 2024-07-03 PROCEDURE — 87086 URINE CULTURE/COLONY COUNT: CPT

## 2024-07-06 LAB
BACTERIA UR CULT: NORMAL
SIGNIFICANT IND 70042: NORMAL
SITE SITE: NORMAL
SOURCE SOURCE: NORMAL

## 2024-07-16 ENCOUNTER — APPOINTMENT (RX ONLY)
Dept: URBAN - METROPOLITAN AREA CLINIC 22 | Facility: CLINIC | Age: 89
Setting detail: DERMATOLOGY
End: 2024-07-16

## 2024-07-16 DIAGNOSIS — Z71.89 OTHER SPECIFIED COUNSELING: ICD-10-CM

## 2024-07-16 DIAGNOSIS — L66.11 CLASSIC LICHEN PLANOPILARIS: ICD-10-CM

## 2024-07-16 DIAGNOSIS — L82.0 INFLAMED SEBORRHEIC KERATOSIS: ICD-10-CM

## 2024-07-16 DIAGNOSIS — L66.1 LICHEN PLANOPILARIS: ICD-10-CM

## 2024-07-16 DIAGNOSIS — L57.0 ACTINIC KERATOSIS: ICD-10-CM

## 2024-07-16 DIAGNOSIS — L81.4 OTHER MELANIN HYPERPIGMENTATION: ICD-10-CM

## 2024-07-16 PROCEDURE — 17000 DESTRUCT PREMALG LESION: CPT | Mod: 59

## 2024-07-16 PROCEDURE — 99204 OFFICE O/P NEW MOD 45 MIN: CPT | Mod: 25

## 2024-07-16 PROCEDURE — ? MEDICATION COUNSELING

## 2024-07-16 PROCEDURE — ? PRESCRIPTION

## 2024-07-16 PROCEDURE — ? LIQUID NITROGEN

## 2024-07-16 PROCEDURE — ? ADDITIONAL NOTES

## 2024-07-16 PROCEDURE — 17003 DESTRUCT PREMALG LES 2-14: CPT | Mod: 59

## 2024-07-16 PROCEDURE — ? COUNSELING

## 2024-07-16 PROCEDURE — 17110 DESTRUCTION B9 LES UP TO 14: CPT

## 2024-07-16 RX ORDER — CLOBETASOL PROPIONATE 0.5 MG/ML
SOLUTION TOPICAL QHS
Qty: 50 | Refills: 2 | COMMUNITY
Start: 2024-07-16

## 2024-07-16 RX ADMIN — CLOBETASOL PROPIONATE: 0.5 SOLUTION TOPICAL at 00:00

## 2024-07-16 ASSESSMENT — LOCATION DETAILED DESCRIPTION DERM
LOCATION DETAILED: RIGHT PROXIMAL RADIAL DORSAL FOREARM
LOCATION DETAILED: LEFT ELBOW
LOCATION DETAILED: LEFT PROXIMAL DORSAL FOREARM
LOCATION DETAILED: RIGHT DORSAL WRIST
LOCATION DETAILED: LEFT INFERIOR MEDIAL FOREHEAD
LOCATION DETAILED: LEFT DISTAL DORSAL FOREARM
LOCATION DETAILED: RIGHT LATERAL DORSAL WRIST
LOCATION DETAILED: RIGHT DISTAL RADIAL DORSAL FOREARM
LOCATION DETAILED: RIGHT DISTAL DORSAL FOREARM
LOCATION DETAILED: LEFT FOREHEAD
LOCATION DETAILED: RIGHT SUPERIOR PARIETAL SCALP
LOCATION DETAILED: RIGHT SUPERIOR MEDIAL FOREHEAD
LOCATION DETAILED: RIGHT PROXIMAL DORSAL FOREARM

## 2024-07-16 ASSESSMENT — LOCATION SIMPLE DESCRIPTION DERM
LOCATION SIMPLE: LEFT FOREHEAD
LOCATION SIMPLE: RIGHT WRIST
LOCATION SIMPLE: RIGHT FOREARM
LOCATION SIMPLE: SCALP
LOCATION SIMPLE: LEFT FOREARM
LOCATION SIMPLE: RIGHT FOREHEAD
LOCATION SIMPLE: LEFT ELBOW

## 2024-07-16 ASSESSMENT — LOCATION ZONE DERM
LOCATION ZONE: SCALP
LOCATION ZONE: FACE
LOCATION ZONE: ARM

## 2024-07-16 ASSESSMENT — SEVERITY ASSESSMENT: SEVERITY: MODERATE TO SEVERE

## 2024-07-16 NOTE — PROCEDURE: LIQUID NITROGEN
Show Spray Paint Technique Variable?: Yes
Post-Care Instructions: I reviewed with the patient in detail post-care instructions. Patient is to wear sunprotection, and avoid picking at any of the treated lesions. Pt may apply Vaseline to crusted or scabbing areas.
Number Of Freeze-Thaw Cycles: 3 freeze-thaw cycles
Medical Necessity Information: It is in your best interest to select a reason for this procedure from the list below. All of these items fulfill various CMS LCD requirements except the new and changing color options.
Render Note In Bullet Format When Appropriate: No
Spray Paint Text: The liquid nitrogen was applied to the skin utilizing a spray paint frosting technique.
Medical Necessity Clause: This procedure was medically necessary because the lesions that were treated were:
Consent: The patient's consent was obtained including but not limited to risks of crusting, scabbing, blistering, scarring, darker or lighter pigmentary change, recurrence, incomplete removal and infection.
Detail Level: Detailed
Application Tool (Optional): Forceps
Duration Of Freeze Thaw-Cycle (Seconds): 3
Number Of Freeze-Thaw Cycles: 2 freeze-thaw cycles

## 2024-07-16 NOTE — PROCEDURE: MEDICATION COUNSELING
Spironolactone Pregnancy And Lactation Text: This medication can cause feminization of the male fetus and should be avoided during pregnancy. The active metabolite is also found in breast milk.
Methotrexate Counseling:  Patient counseled regarding adverse effects of methotrexate including but not limited to nausea, vomiting, abnormalities in liver function tests. Patients may develop mouth sores, rash, diarrhea, and abnormalities in blood counts. The patient understands that monitoring is required including LFT's and blood counts.  There is a rare possibility of scarring of the liver and lung problems that can occur when taking methotrexate. Persistent nausea, loss of appetite, pale stools, dark urine, cough, and shortness of breath should be reported immediately. Patient advised to discontinue methotrexate treatment at least three months before attempting to become pregnant.  I discussed the need for folate supplements while taking methotrexate.  These supplements can decrease side effects during methotrexate treatment. The patient verbalized understanding of the proper use and possible adverse effects of methotrexate.  All of the patient's questions and concerns were addressed.
Ketoconazole Counseling:   Patient counseled regarding improving absorption with orange juice.  Adverse effects include but are not limited to breast enlargement, headache, diarrhea, nausea, upset stomach, liver function test abnormalities, taste disturbance, and stomach pain.  There is a rare possibility of liver failure that can occur when taking ketoconazole. The patient understands that monitoring of LFTs may be required, especially at baseline. The patient verbalized understanding of the proper use and possible adverse effects of ketoconazole.  All of the patient's questions and concerns were addressed.
Olumiant Pregnancy And Lactation Text: Based on animal studies, Olumiant may cause embryo-fetal harm when administered to pregnant women.  The medication should not be used in pregnancy.  Breastfeeding is not recommended during treatment.
Sarecycline Pregnancy And Lactation Text: This medication is Pregnancy Category D and not consider safe during pregnancy. It is also excreted in breast milk.
Eucrisa Pregnancy And Lactation Text: This medication has not been assigned a Pregnancy Risk Category but animal studies failed to show danger with the topical medication. It is unknown if the medication is excreted in breast milk.
Tazorac Pregnancy And Lactation Text: This medication is not safe during pregnancy. It is unknown if this medication is excreted in breast milk.
Bimzelx Pregnancy And Lactation Text: This medication crosses the placenta and the safety is uncertain during pregnancy. It is unknown if this medication is present in breast milk.
Calcipotriene Counseling:  I discussed with the patient the risks of calcipotriene including but not limited to erythema, scaling, itching, and irritation.
Dutasteride Male Counseling: Dustasteride Counseling:  I discussed with the patient the risks of use of dutasteride including but not limited to decreased libido, decreased ejaculate volume, and gynecomastia. Women who can become pregnant should not handle medication.  All of the patient's questions and concerns were addressed.
Niacinamide Counseling: I recommended taking niacin or niacinamide, also know as vitamin B3, twice daily. Recent evidence suggests that taking vitamin B3 (500 mg twice daily) can reduce the risk of actinic keratoses and non-melanoma skin cancers. Side effects of vitamin B3 include flushing and headache.
Aklief Pregnancy And Lactation Text: It is unknown if this medication is safe to use during pregnancy.  It is unknown if this medication is excreted in breast milk.  Breastfeeding women should use the topical cream on the smallest area of the skin for the shortest time needed while breastfeeding.  Do not apply to nipple and areola.
Rhofade Counseling: Rhofade is a topical medication which can decrease superficial blood flow where applied. Side effects are uncommon and include stinging, redness and allergic reactions.
Zyclara Counseling:  I discussed with the patient the risks of imiquimod including but not limited to erythema, scaling, itching, weeping, crusting, and pain.  Patient understands that the inflammatory response to imiquimod is variable from person to person and was educated regarded proper titration schedule.  If flu-like symptoms develop, patient knows to discontinue the medication and contact us.
Siliq Counseling:  I discussed with the patient the risks of Siliq including but not limited to new or worsening depression, suicidal thoughts and behavior, immunosuppression, malignancy, posterior leukoencephalopathy syndrome, and serious infections.  The patient understands that monitoring is required including a PPD at baseline and must alert us or the primary physician if symptoms of infection or other concerning signs are noted. There is also a special program designed to monitor depression which is required with Siliq.
Cephalexin Pregnancy And Lactation Text: This medication is Pregnancy Category B and considered safe during pregnancy.  It is also excreted in breast milk but can be used safely for shorter doses.
Dapsone Pregnancy And Lactation Text: This medication is Pregnancy Category C and is not considered safe during pregnancy or breast feeding.
Hydroxyzine Pregnancy And Lactation Text: This medication is not safe during pregnancy and should not be taken. It is also excreted in breast milk and breast feeding isn't recommended.
Metronidazole Counseling:  I discussed with the patient the risks of metronidazole including but not limited to seizures, nausea/vomiting, a metallic taste in the mouth, nausea/vomiting and severe allergy.
Xolair Counseling:  Patient informed of potential adverse effects including but not limited to fever, muscle aches, rash and allergic reactions.  The patient verbalized understanding of the proper use and possible adverse effects of Xolair.  All of the patient's questions and concerns were addressed.
Tranexamic Acid Pregnancy And Lactation Text: It is unknown if this medication is safe during pregnancy or breast feeding.
Detail Level: Generalized
Acitretin Pregnancy And Lactation Text: This medication is Pregnancy Category X and should not be given to women who are pregnant or may become pregnant in the future. This medication is excreted in breast milk.
Humira Pregnancy And Lactation Text: This medication is Pregnancy Category B and is considered safe during pregnancy. It is unknown if this medication is excreted in breast milk.
Topical Sulfur Applications Pregnancy And Lactation Text: This medication is Pregnancy Category C and has an unknown safety profile during pregnancy. It is unknown if this topical medication is excreted in breast milk.
Mirvaso Pregnancy And Lactation Text: This medication has not been assigned a Pregnancy Risk Category. It is unknown if the medication is excreted in breast milk.
Calcipotriene Pregnancy And Lactation Text: The use of this medication during pregnancy or lactation is not recommended as there is insufficient data.
Cimzia Counseling:  I discussed with the patient the risks of Cimzia including but not limited to immunosuppression, allergic reactions and infections.  The patient understands that monitoring is required including a PPD at baseline and must alert us or the primary physician if symptoms of infection or other concerning signs are noted.
Topical Clindamycin Counseling: Patient counseled that this medication may cause skin irritation or allergic reactions.  In the event of skin irritation, the patient was advised to reduce the amount of the drug applied or use it less frequently.   The patient verbalized understanding of the proper use and possible adverse effects of clindamycin.  All of the patient's questions and concerns were addressed.
Oxybutynin Counseling:  I discussed with the patient the risks of oxybutynin including but not limited to skin rash, drowsiness, dry mouth, difficulty urinating, and blurred vision.
Methotrexate Pregnancy And Lactation Text: This medication is Pregnancy Category X and is known to cause fetal harm. This medication is excreted in breast milk.
Ketoconazole Pregnancy And Lactation Text: This medication is Pregnancy Category C and it isn't know if it is safe during pregnancy. It is also excreted in breast milk and breast feeding isn't recommended.
Zyclara Pregnancy And Lactation Text: This medication is Pregnancy Category C. It is unknown if this medication is excreted in breast milk.
Azelaic Acid Counseling: Patient counseled that medicine may cause skin irritation and to avoid applying near the eyes.  In the event of skin irritation, the patient was advised to reduce the amount of the drug applied or use it less frequently.   The patient verbalized understanding of the proper use and possible adverse effects of azelaic acid.  All of the patient's questions and concerns were addressed.
Tetracycline Counseling: Patient counseled regarding possible photosensitivity and increased risk for sunburn.  Patient instructed to avoid sunlight, if possible.  When exposed to sunlight, patients should wear protective clothing, sunglasses, and sunscreen.  The patient was instructed to call the office immediately if the following severe adverse effects occur:  hearing changes, easy bruising/bleeding, severe headache, or vision changes.  The patient verbalized understanding of the proper use and possible adverse effects of tetracycline.  All of the patient's questions and concerns were addressed. Patient understands to avoid pregnancy while on therapy due to potential birth defects.
Hydroquinone Counseling:  Patient advised that medication may result in skin irritation, lightening (hypopigmentation), dryness, and burning.  In the event of skin irritation, the patient was advised to reduce the amount of the drug applied or use it less frequently.  Rarely, spots that are treated with hydroquinone can become darker (pseudoochronosis).  Should this occur, patient instructed to stop medication and call the office. The patient verbalized understanding of the proper use and possible adverse effects of hydroquinone.  All of the patient's questions and concerns were addressed.
Niacinamide Pregnancy And Lactation Text: These medications are considered safe during pregnancy.
Cantharidin Counseling:  I discussed with the patient the risks of Cantharidin including but not limited to pain, redness, burning, itching, and blistering.
Metronidazole Pregnancy And Lactation Text: This medication is Pregnancy Category B and considered safe during pregnancy.  It is also excreted in breast milk.
Dutasteride Female Counseling: Dutasteride Counseling:  I discussed with the patient the risks of use of dutasteride including but not limited to decreased libido and sexual dysfunction. Explained the teratogenic nature of the medication and stressed the importance of not getting pregnant during treatment. All of the patient's questions and concerns were addressed.
Siliq Pregnancy And Lactation Text: The risk during pregnancy and breastfeeding is uncertain with this medication.
Opioid Pregnancy And Lactation Text: These medications can lead to premature delivery and should be avoided during pregnancy. These medications are also present in breast milk in small amounts.
Clindamycin Counseling: I counseled the patient regarding use of clindamycin as an antibiotic for prophylactic and/or therapeutic purposes. Clindamycin is active against numerous classes of bacteria, including skin bacteria. Side effects may include nausea, diarrhea, gastrointestinal upset, rash, hives, yeast infections, and in rare cases, colitis.
Azathioprine Counseling:  I discussed with the patient the risks of azathioprine including but not limited to myelosuppression, immunosuppression, hepatotoxicity, lymphoma, and infections.  The patient understands that monitoring is required including baseline LFTs, Creatinine, possible TPMP genotyping and weekly CBCs for the first month and then every 2 weeks thereafter.  The patient verbalized understanding of the proper use and possible adverse effects of azathioprine.  All of the patient's questions and concerns were addressed.
Bexarotene Counseling:  I discussed with the patient the risks of bexarotene including but not limited to hair loss, dry lips/skin/eyes, liver abnormalities, hyperlipidemia, pancreatitis, depression/suicidal ideation, photosensitivity, drug rash/allergic reactions, hypothyroidism, anemia, leukopenia, infection, cataracts, and teratogenicity.  Patient understands that they will need regular blood tests to check lipid profile, liver function tests, white blood cell count, thyroid function tests and pregnancy test if applicable.
Gabapentin Counseling: I discussed with the patient the risks of gabapentin including but not limited to dizziness, somnolence, fatigue and ataxia.
Hyrimoz Counseling:  I discussed with the patient the risks of adalimumab including but not limited to myelosuppression, immunosuppression, autoimmune hepatitis, demyelinating diseases, lymphoma, and serious infections.  The patient understands that monitoring is required including a PPD at baseline and must alert us or the primary physician if symptoms of infection or other concerning signs are noted.
Xolair Pregnancy And Lactation Text: This medication is Pregnancy Category B and is considered safe during pregnancy. This medication is excreted in breast milk.
Wartpeel Counseling:  I discussed with the patient the risks of Wartpeel including but not limited to erythema, scaling, itching, weeping, crusting, and pain.
Opzelura Counseling:  I discussed with the patient the risks of Opzelura including but not limited to nasopharngitis, bronchitis, ear infection, eosinophila, hives, diarrhea, folliculitis, tonsillitis, and rhinorrhea.  Taken orally, this medication has been linked to serious infections; higher rate of mortality; malignancy and lymphoproliferative disorders; major adverse cardiovascular events; thrombosis; thrombocytopenia, anemia, and neutropenia; and lipid elevations.
Valtrex Counseling: I discussed with the patient the risks of valacyclovir including but not limited to kidney damage, nausea, vomiting and severe allergy.  The patient understands that if the infection seems to be worsening or is not improving, they are to call.
Cantharidin Pregnancy And Lactation Text: This medication has not been proven safe during pregnancy. It is unknown if this medication is excreted in breast milk.
Albendazole Counseling:  I discussed with the patient the risks of albendazole including but not limited to cytopenia, kidney damage, nausea/vomiting and severe allergy.  The patient understands that this medication is being used in an off-label manner.
Include Pregnancy/Lactation Warning?: No
Oxybutynin Pregnancy And Lactation Text: This medication is Pregnancy Category B and is considered safe during pregnancy. It is unknown if it is excreted in breast milk.
Cimzia Pregnancy And Lactation Text: This medication crosses the placenta but can be considered safe in certain situations. Cimzia may be excreted in breast milk.
Prednisone Counseling:  I discussed with the patient the risks of prolonged use of prednisone including but not limited to weight gain, insomnia, osteoporosis, mood changes, diabetes, susceptibility to infection, glaucoma and high blood pressure.  In cases where prednisone use is prolonged, patients should be monitored with blood pressure checks, serum glucose levels and an eye exam.  Additionally, the patient may need to be placed on GI prophylaxis, PCP prophylaxis, and calcium and vitamin D supplementation and/or a bisphosphonate.  The patient verbalized understanding of the proper use and the possible adverse effects of prednisone.  All of the patient's questions and concerns were addressed.
Nsaids Counseling: NSAID Counseling: I discussed with the patient that NSAIDs should be taken with food. Prolonged use of NSAIDs can result in the development of stomach ulcers.  Patient advised to stop taking NSAIDs if abdominal pain occurs.  The patient verbalized understanding of the proper use and possible adverse effects of NSAIDs.  All of the patient's questions and concerns were addressed.
Azelaic Acid Pregnancy And Lactation Text: This medication is considered safe during pregnancy and breast feeding.
Terbinafine Counseling: Patient counseling regarding adverse effects of terbinafine including but not limited to headache, diarrhea, rash, upset stomach, liver function test abnormalities, itching, taste/smell disturbance, nausea, abdominal pain, and flatulence.  There is a rare possibility of liver failure that can occur when taking terbinafine.  The patient understands that a baseline LFT and kidney function test may be required. The patient verbalized understanding of the proper use and possible adverse effects of terbinafine.  All of the patient's questions and concerns were addressed.
Dutasteride Pregnancy And Lactation Text: This medication is absolutely contraindicated in women, especially during pregnancy and breast feeding. Feminization of male fetuses is possible if taking while pregnant.
Azathioprine Pregnancy And Lactation Text: This medication is Pregnancy Category D and isn't considered safe during pregnancy. It is unknown if this medication is excreted in breast milk.
Minocycline Counseling: Patient advised regarding possible photosensitivity and discoloration of the teeth, skin, lips, tongue and gums.  Patient instructed to avoid sunlight, if possible.  When exposed to sunlight, patients should wear protective clothing, sunglasses, and sunscreen.  The patient was instructed to call the office immediately if the following severe adverse effects occur:  hearing changes, easy bruising/bleeding, severe headache, or vision changes.  The patient verbalized understanding of the proper use and possible adverse effects of minocycline.  All of the patient's questions and concerns were addressed.
5-Fu Counseling: 5-Fluorouracil Counseling:  I discussed with the patient the risks of 5-fluorouracil including but not limited to erythema, scaling, itching, weeping, crusting, and pain.
Clindamycin Pregnancy And Lactation Text: This medication can be used in pregnancy if certain situations. Clindamycin is also present in breast milk.
Solaraze Counseling:  I discussed with the patient the risks of Solaraze including but not limited to erythema, scaling, itching, weeping, crusting, and pain.
Rinvoq Counseling: I discussed with the patient the risks of Rinvoq therapy including but not limited to upper respiratory tract infections, shingles, cold sores, bronchitis, nausea, cough, fever, acne, and headache. Live vaccines should be avoided.  This medication has been linked to serious infections; higher rate of mortality; malignancy and lymphoproliferative disorders; major adverse cardiovascular events; thrombosis; thrombocytopenia, anemia, and neutropenia; lipid elevations; liver enzyme elevations; and gastrointestinal perforations.
Valtrex Pregnancy And Lactation Text: this medication is Pregnancy Category B and is considered safe during pregnancy. This medication is not directly found in breast milk but it's metabolite acyclovir is present.
Simponi Counseling:  I discussed with the patient the risks of golimumab including but not limited to myelosuppression, immunosuppression, autoimmune hepatitis, demyelinating diseases, lymphoma, and serious infections.  The patient understands that monitoring is required including a PPD at baseline and must alert us or the primary physician if symptoms of infection or other concerning signs are noted.
Opzelura Pregnancy And Lactation Text: There is insufficient data to evaluate drug-associated risk for major birth defects, miscarriage, or other adverse maternal or fetal outcomes.  There is a pregnancy registry that monitors pregnancy outcomes in pregnant persons exposed to the medication during pregnancy.  It is unknown if this medication is excreted in breast milk.  Do not breastfeed during treatment and for about 4 weeks after the last dose.
Gabapentin Pregnancy And Lactation Text: This medication is Pregnancy Category C and isn't considered safe during pregnancy. It is excreted in breast milk.
Wartpeel Pregnancy And Lactation Text: This medication is Pregnancy Category X and contraindicated in pregnancy and in women who may become pregnant. It is unknown if this medication is excreted in breast milk.
Bexarotene Pregnancy And Lactation Text: This medication is Pregnancy Category X and should not be given to women who are pregnant or may become pregnant. This medication should not be used if you are breast feeding.
Albendazole Pregnancy And Lactation Text: This medication is Pregnancy Category C and it isn't known if it is safe during pregnancy. It is also excreted in breast milk.
Opioid Counseling: I discussed with the patient the potential side effects of opioids including but not limited to addiction, altered mental status, and depression. I stressed avoiding alcohol, benzodiazepines, muscle relaxants and sleep aids unless specifically okayed by a physician. The patient verbalized understanding of the proper use and possible adverse effects of opioids. All of the patient's questions and concerns were addressed. They were instructed to flush the remaining pills down the toilet if they did not need them for pain.
Propranolol Counseling:  I discussed with the patient the risks of propranolol including but not limited to low heart rate, low blood pressure, low blood sugar, restlessness and increased cold sensitivity. They should call the office if they experience any of these side effects.
Arava Counseling:  Patient counseled regarding adverse effects of Arava including but not limited to nausea, vomiting, abnormalities in liver function tests. Patients may develop mouth sores, rash, diarrhea, and abnormalities in blood counts. The patient understands that monitoring is required including LFTs and blood counts.  There is a rare possibility of scarring of the liver and lung problems that can occur when taking methotrexate. Persistent nausea, loss of appetite, pale stools, dark urine, cough, and shortness of breath should be reported immediately. Patient advised to discontinue Arava treatment and consult with a physician prior to attempting conception. The patient will have to undergo a treatment to eliminate Arava from the body prior to conception.
Prednisone Pregnancy And Lactation Text: This medication is Pregnancy Category C and it isn't know if it is safe during pregnancy. This medication is excreted in breast milk.
Cosentyx Counseling:  I discussed with the patient the risks of Cosentyx including but not limited to worsening of Crohn's disease, immunosuppression, allergic reactions and infections.  The patient understands that monitoring is required including a PPD at baseline and must alert us or the primary physician if symptoms of infection or other concerning signs are noted.
Topical Ketoconazole Counseling: Patient counseled that this medication may cause skin irritation or allergic reactions.  In the event of skin irritation, the patient was advised to reduce the amount of the drug applied or use it less frequently.   The patient verbalized understanding of the proper use and possible adverse effects of ketoconazole.  All of the patient's questions and concerns were addressed.
Imiquimod Counseling:  I discussed with the patient the risks of imiquimod including but not limited to erythema, scaling, itching, weeping, crusting, and pain.  Patient understands that the inflammatory response to imiquimod is variable from person to person and was educated regarded proper titration schedule.  If flu-like symptoms develop, patient knows to discontinue the medication and contact us.
Erivedge Counseling- I discussed with the patient the risks of Erivedge including but not limited to nausea, vomiting, diarrhea, constipation, weight loss, changes in the sense of taste, decreased appetite, muscle spasms, and hair loss.  The patient verbalized understanding of the proper use and possible adverse effects of Erivedge.  All of the patient's questions and concerns were addressed.
Doxycycline Counseling:  Patient counseled regarding possible photosensitivity and increased risk for sunburn.  Patient instructed to avoid sunlight, if possible.  When exposed to sunlight, patients should wear protective clothing, sunglasses, and sunscreen.  The patient was instructed to call the office immediately if the following severe adverse effects occur:  hearing changes, easy bruising/bleeding, severe headache, or vision changes.  The patient verbalized understanding of the proper use and possible adverse effects of doxycycline.  All of the patient's questions and concerns were addressed.
Solaraze Pregnancy And Lactation Text: This medication is Pregnancy Category B and is considered safe. There is some data to suggest avoiding during the third trimester. It is unknown if this medication is excreted in breast milk.
Nsaids Pregnancy And Lactation Text: These medications are considered safe up to 30 weeks gestation. It is excreted in breast milk.
Finasteride Male Counseling: Finasteride Counseling:  I discussed with the patient the risks of use of finasteride including but not limited to decreased libido, decreased ejaculate volume, gynecomastia, and depression. Women should not handle medication.  All of the patient's questions and concerns were addressed.
Cellcept Counseling:  I discussed with the patient the risks of mycophenolate mofetil including but not limited to infection/immunosuppression, GI upset, hypokalemia, hypercholesterolemia, bone marrow suppression, lymphoproliferative disorders, malignancy, GI ulceration/bleed/perforation, colitis, interstitial lung disease, kidney failure, progressive multifocal leukoencephalopathy, and birth defects.  The patient understands that monitoring is required including a baseline creatinine and regular CBC testing. In addition, patient must alert us immediately if symptoms of infection or other concerning signs are noted.
Terbinafine Pregnancy And Lactation Text: This medication is Pregnancy Category B and is considered safe during pregnancy. It is also excreted in breast milk and breast feeding isn't recommended.
Winlevi Counseling:  I discussed with the patient the risks of topical clascoterone including but not limited to erythema, scaling, itching, and stinging. Patient voiced their understanding.
Rinvoq Pregnancy And Lactation Text: Based on animal studies, Rinvoq may cause embryo-fetal harm when administered to pregnant women.  The medication should not be used in pregnancy.  Breastfeeding is not recommended during treatment and for 6 days after the last dose.
Isotretinoin Counseling: Patient should get monthly blood tests, not donate blood, not drive at night if vision affected, not share medication, and not undergo elective surgery for 6 months after tx completed. Side effects reviewed, pt to contact office should one occur.
Glycopyrrolate Counseling:  I discussed with the patient the risks of glycopyrrolate including but not limited to skin rash, drowsiness, dry mouth, difficulty urinating, and blurred vision.
Picato Counseling:  I discussed with the patient the risks of Picato including but not limited to erythema, scaling, itching, weeping, crusting, and pain.
Fluconazole Counseling:  Patient counseled regarding adverse effects of fluconazole including but not limited to headache, diarrhea, nausea, upset stomach, liver function test abnormalities, taste disturbance, and stomach pain.  There is a rare possibility of liver failure that can occur when taking fluconazole.  The patient understands that monitoring of LFTs and kidney function test may be required, especially at baseline. The patient verbalized understanding of the proper use and possible adverse effects of fluconazole.  All of the patient's questions and concerns were addressed.
Ilumya Counseling: I discussed with the patient the risks of tildrakizumab including but not limited to immunosuppression, malignancy, posterior leukoencephalopathy syndrome, and serious infections.  The patient understands that monitoring is required including a PPD at baseline and must alert us or the primary physician if symptoms of infection or other concerning signs are noted.
Propranolol Pregnancy And Lactation Text: This medication is Pregnancy Category C and it isn't known if it is safe during pregnancy. It is excreted in breast milk.
Stelara Counseling:  I discussed with the patient the risks of ustekinumab including but not limited to immunosuppression, malignancy, posterior leukoencephalopathy syndrome, and serious infections.  The patient understands that monitoring is required including a PPD at baseline and must alert us or the primary physician if symptoms of infection or other concerning signs are noted.
Arava Pregnancy And Lactation Text: This medication is Pregnancy Category X and is absolutely contraindicated during pregnancy. It is unknown if it is excreted in breast milk.
Ivermectin Counseling:  Patient instructed to take medication on an empty stomach with a full glass of water.  Patient informed of potential adverse effects including but not limited to nausea, diarrhea, dizziness, itching, and swelling of the extremities or lymph nodes.  The patient verbalized understanding of the proper use and possible adverse effects of ivermectin.  All of the patient's questions and concerns were addressed.
Soolantra Counseling: I discussed with the patients the risks of topial Soolantra. This is a medicine which decreases the number of mites and inflammation in the skin. You experience burning, stinging, eye irritation or allergic reactions.  Please call our office if you develop any problems from using this medication.
Olanzapine Counseling- I discussed with the patient the common side effects of olanzapine including but are not limited to: lack of energy, dry mouth, increased appetite, sleepiness, tremor, constipation, dizziness, changes in behavior, or restlessness.  Explained that teenagers are more likely to experience headaches, abdominal pain, pain in the arms or legs, tiredness, and sleepiness.  Serious side effects include but are not limited: increased risk of death in elderly patients who are confused, have memory loss, or dementia-related psychosis; hyperglycemia; increased cholesterol and triglycerides; and weight gain.
Quinolones Counseling:  I discussed with the patient the risks of fluoroquinolones including but not limited to GI upset, allergic reaction, drug rash, diarrhea, dizziness, photosensitivity, yeast infections, liver function test abnormalities, tendonitis/tendon rupture.
Finasteride Female Counseling: Finasteride Counseling:  I discussed with the patient the risks of use of finasteride including but not limited to decreased libido and sexual dysfunction. Explained the teratogenic nature of the medication and stressed the importance of not getting pregnant during treatment. All of the patient's questions and concerns were addressed.
Drysol Counseling:  I discussed with the patient the risks of drysol/aluminum chloride including but not limited to skin rash, itching, irritation, burning.
Skyrizi Counseling: I discussed with the patient the risks of risankizumab-rzaa including but not limited to immunosuppression, and serious infections.  The patient understands that monitoring is required including a PPD at baseline and must alert us or the primary physician if symptoms of infection or other concerning signs are noted.
Isotretinoin Pregnancy And Lactation Text: This medication is Pregnancy Category X and is considered extremely dangerous during pregnancy. It is unknown if it is excreted in breast milk.
Glycopyrrolate Pregnancy And Lactation Text: This medication is Pregnancy Category B and is considered safe during pregnancy. It is unknown if it is excreted breast milk.
Winlevi Pregnancy And Lactation Text: This medication is considered safe during pregnancy and breastfeeding.
Sotyktu Counseling:  I discussed the most common side effects of Sotyktu including: common cold, sore throat, sinus infections, cold sores, canker sores, folliculitis, and acne.? I also discussed more serious side effects of Sotyktu including but not limited to: serious allergic reactions; increased risk for infections such as TB; cancers such as lymphomas; rhabdomyolysis and elevated CPK; and elevated triglycerides and liver enzymes.?
Fluconazole Pregnancy And Lactation Text: This medication is Pregnancy Category C and it isn't know if it is safe during pregnancy. It is also excreted in breast milk.
Cibinqo Counseling: I discussed with the patient the risks of Cibinqo therapy including but not limited to common cold, nausea, headache, cold sores, increased blood CPK levels, dizziness, UTIs, fatigue, acne, and vomitting. Live vaccines should be avoided.  This medication has been linked to serious infections; higher rate of mortality; malignancy and lymphoproliferative disorders; major adverse cardiovascular events; thrombosis; thrombocytopenia and lymphopenia; lipid elevations; and retinal detachment.
SSKI Counseling:  I discussed with the patient the risks of SSKI including but not limited to thyroid abnormalities, metallic taste, GI upset, fever, headache, acne, arthralgias, paraesthesias, lymphadenopathy, easy bleeding, arrhythmias, and allergic reaction.
Azithromycin Counseling:  I discussed with the patient the risks of azithromycin including but not limited to GI upset, allergic reaction, drug rash, diarrhea, and yeast infections.
Cimetidine Counseling:  I discussed with the patient the risks of Cimetidine including but not limited to gynecomastia, headache, diarrhea, nausea, drowsiness, arrhythmias, pancreatitis, skin rashes, psychosis, bone marrow suppression and kidney toxicity.
Clofazimine Counseling:  I discussed with the patient the risks of clofazimine including but not limited to skin and eye pigmentation, liver damage, nausea/vomiting, gastrointestinal bleeding and allergy.
Klisyri Counseling:  I discussed with the patient the risks of Klisyri including but not limited to erythema, scaling, itching, weeping, crusting, and pain.
Topical Metronidazole Counseling: Metronidazole is a topical antibiotic medication. You may experience burning, stinging, redness, or allergic reactions.  Please call our office if you develop any problems from using this medication.
Dupixent Counseling: I discussed with the patient the risks of dupilumab including but not limited to eye infection and irritation, cold sores, injection site reactions, worsening of asthma, allergic reactions and increased risk of parasitic infection.  Live vaccines should be avoided while taking dupilumab. Dupilumab will also interact with certain medications such as warfarin and cyclosporine. The patient understands that monitoring is required and they must alert us or the primary physician if symptoms of infection or other concerning signs are noted.
Libtayo Counseling- I discussed with the patient the risks of Libtayo including but not limited to nausea, vomiting, diarrhea, and bone or muscle pain.  The patient verbalized understanding of the proper use and possible adverse effects of Libtayo.  All of the patient's questions and concerns were addressed.
Finasteride Pregnancy And Lactation Text: This medication is absolutely contraindicated during pregnancy. It is unknown if it is excreted in breast milk.
Olanzapine Pregnancy And Lactation Text: This medication is pregnancy category C.   There are no adequate and well controlled trials with olanzapine in pregnant females.  Olanzapine should be used during pregnancy only if the potential benefit justifies the potential risk to the fetus.   In a study in lactating healthy women, olanzapine was excreted in breast milk.  It is recommended that women taking olanzapine should not breast feed.
Sotyktu Pregnancy And Lactation Text: There is insufficient data to evaluate whether or not Sotyktu is safe to use during pregnancy.? ?It is not known if Sotyktu passes into breast milk and whether or not it is safe to use when breastfeeding.??
Soolantra Pregnancy And Lactation Text: This medication is Pregnancy Category C. This medication is considered safe during breast feeding.
Benzoyl Peroxide Counseling: Patient counseled that medicine may cause skin irritation and bleach clothing.  In the event of skin irritation, the patient was advised to reduce the amount of the drug applied or use it less frequently.   The patient verbalized understanding of the proper use and possible adverse effects of benzoyl peroxide.  All of the patient's questions and concerns were addressed.
Cyclophosphamide Counseling:  I discussed with the patient the risks of cyclophosphamide including but not limited to hair loss, hormonal abnormalities, decreased fertility, abdominal pain, diarrhea, nausea and vomiting, bone marrow suppression and infection. The patient understands that monitoring is required while taking this medication.
High Dose Vitamin A Counseling: Side effects reviewed, pt to contact office should one occur.
Cibinqo Pregnancy And Lactation Text: It is unknown if this medication will adversely affect pregnancy or breast feeding.  You should not take this medication if you are currently pregnant or planning a pregnancy or while breastfeeding.
Azithromycin Pregnancy And Lactation Text: This medication is considered safe during pregnancy and is also secreted in breast milk.
Protopic Counseling: Patient may experience a mild burning sensation during topical application. Protopic is not approved in children less than 2 years of age. There have been case reports of hematologic and skin malignancies in patients using topical calcineurin inhibitors although causality is questionable.
Infliximab Counseling:  I discussed with the patient the risks of infliximab including but not limited to myelosuppression, immunosuppression, autoimmune hepatitis, demyelinating diseases, lymphoma, and serious infections.  The patient understands that monitoring is required including a PPD at baseline and must alert us or the primary physician if symptoms of infection or other concerning signs are noted.
VTAMA Counseling: I discussed with the patient that VTAMA is not for use in the eyes, mouth or mouth. They should call the office if they develop any signs of allergic reactions to VTAMA. The patient verbalized understanding of the proper use and possible adverse effects of VTAMA.  All of the patient's questions and concerns were addressed.
Hydroxychloroquine Counseling:  I discussed with the patient that a baseline ophthalmologic exam is needed at the start of therapy and every year thereafter while on therapy. A CBC may also be warranted for monitoring.  The side effects of this medication were discussed with the patient, including but not limited to agranulocytosis, aplastic anemia, seizures, rashes, retinopathy, and liver toxicity. Patient instructed to call the office should any adverse effect occur.  The patient verbalized understanding of the proper use and possible adverse effects of Plaquenil.  All the patient's questions and concerns were addressed.
Griseofulvin Counseling:  I discussed with the patient the risks of griseofulvin including but not limited to photosensitivity, cytopenia, liver damage, nausea/vomiting and severe allergy.  The patient understands that this medication is best absorbed when taken with a fatty meal (e.g., ice cream or french fries).
Topical Metronidazole Pregnancy And Lactation Text: This medication is Pregnancy Category B and considered safe during pregnancy.  It is also considered safe to use while breastfeeding.
Sski Pregnancy And Lactation Text: This medication is Pregnancy Category D and isn't considered safe during pregnancy. It is excreted in breast milk.
Klisyri Pregnancy And Lactation Text: It is unknown if this medication can harm a developing fetus or if it is excreted in breast milk.
Dupixent Pregnancy And Lactation Text: This medication likely crosses the placenta but the risk for the fetus is uncertain. This medication is excreted in breast milk.
Taltz Counseling: I discussed with the patient the risks of ixekizumab including but not limited to immunosuppression, serious infections, worsening of inflammatory bowel disease and drug reactions.  The patient understands that monitoring is required including a PPD at baseline and must alert us or the primary physician if symptoms of infection or other concerning signs are noted.
Libtayo Pregnancy And Lactation Text: This medication is contraindicated in pregnancy and when breast feeding.
Birth Control Pills Counseling: Birth Control Pill Counseling: I discussed with the patient the potential side effects of OCPs including but not limited to increased risk of stroke, heart attack, thrombophlebitis, deep venous thrombosis, hepatic adenomas, breast changes, GI upset, headaches, and depression.  The patient verbalized understanding of the proper use and possible adverse effects of OCPs. All of the patient's questions and concerns were addressed.
Oral Minoxidil Counseling- I discussed with the patient the risks of oral minoxidil including but not limited to shortness of breath, swelling of the feet or ankles, dizziness, lightheadedness, unwanted hair growth and allergic reaction.  The patient verbalized understanding of the proper use and possible adverse effects of oral minoxidil.  All of the patient's questions and concerns were addressed.
Topical Retinoid counseling:  Patient advised to apply a pea-sized amount only at bedtime and wait 30 minutes after washing their face before applying.  If too drying, patient may add a non-comedogenic moisturizer. The patient verbalized understanding of the proper use and possible adverse effects of retinoids.  All of the patient's questions and concerns were addressed.
Xeljanz Counseling: I discussed with the patient the risks of Xeljanz therapy including increased risk of infection, liver issues, headache, diarrhea, or cold symptoms. Live vaccines should be avoided. They were instructed to call if they have any problems.
Elidel Counseling: Patient may experience a mild burning sensation during topical application. Elidel is not approved in children less than 2 years of age. There have been case reports of hematologic and skin malignancies in patients using topical calcineurin inhibitors although causality is questionable.
Cyclophosphamide Pregnancy And Lactation Text: This medication is Pregnancy Category D and it isn't considered safe during pregnancy. This medication is excreted in breast milk.
Rifampin Counseling: I discussed with the patient the risks of rifampin including but not limited to liver damage, kidney damage, red-orange body fluids, nausea/vomiting and severe allergy.
Litfulo Counseling: I discussed with the patient the risks of Litfulo therapy including but not limited to upper respiratory tract infections, shingles, cold sores, and nausea. Live vaccines should be avoided.  This medication has been linked to serious infections; higher rate of mortality; malignancy and lymphoproliferative disorders; major adverse cardiovascular events; thrombosis; gastrointestinal perforations; neutropenia; lymphopenia; anemia; liver enzyme elevations; and lipid elevations.
Adbry Counseling: I discussed with the patient the risks of tralokinumab including but not limited to eye infection and irritation, cold sores, injection site reactions, worsening of asthma, allergic reactions and increased risk of parasitic infection.  Live vaccines should be avoided while taking tralokinumab. The patient understands that monitoring is required and they must alert us or the primary physician if symptoms of infection or other concerning signs are noted.
Doxepin Counseling:  Patient advised that the medication is sedating and not to drive a car after taking this medication. Patient informed of potential adverse effects including but not limited to dry mouth, urinary retention, and blurry vision.  The patient verbalized understanding of the proper use and possible adverse effects of doxepin.  All of the patient's questions and concerns were addressed.
Doxycycline Pregnancy And Lactation Text: This medication is Pregnancy Category D and not consider safe during pregnancy. It is also excreted in breast milk but is considered safe for shorter treatment courses.
Benzoyl Peroxide Pregnancy And Lactation Text: This medication is Pregnancy Category C. It is unknown if benzoyl peroxide is excreted in breast milk.
Protopic Pregnancy And Lactation Text: This medication is Pregnancy Category C. It is unknown if this medication is excreted in breast milk when applied topically.
Hydroxychloroquine Pregnancy And Lactation Text: This medication has been shown to cause fetal harm but it isn't assigned a Pregnancy Risk Category. There are small amounts excreted in breast milk.
Griseofulvin Pregnancy And Lactation Text: This medication is Pregnancy Category X and is known to cause serious birth defects. It is unknown if this medication is excreted in breast milk but breast feeding should be avoided.
Vtama Pregnancy And Lactation Text: It is unknown if this medication can cause problems during pregnancy and breastfeeding.
High Dose Vitamin A Pregnancy And Lactation Text: High dose vitamin A therapy is contraindicated during pregnancy and breast feeding.
Thalidomide Counseling: I discussed with the patient the risks of thalidomide including but not limited to birth defects, anxiety, weakness, chest pain, dizziness, cough and severe allergy.
Colchicine Counseling:  Patient counseled regarding adverse effects including but not limited to stomach upset (nausea, vomiting, stomach pain, or diarrhea).  Patient instructed to limit alcohol consumption while taking this medication.  Colchicine may reduce blood counts especially with prolonged use.  The patient understands that monitoring of kidney function and blood counts may be required, especially at baseline. The patient verbalized understanding of the proper use and possible adverse effects of colchicine.  All of the patient's questions and concerns were addressed.
Bactrim Counseling:  I discussed with the patient the risks of sulfa antibiotics including but not limited to GI upset, allergic reaction, drug rash, diarrhea, dizziness, photosensitivity, and yeast infections.  Rarely, more serious reactions can occur including but not limited to aplastic anemia, agranulocytosis, methemoglobinemia, blood dyscrasias, liver or kidney failure, lung infiltrates or desquamative/blistering drug rashes.
Odomzo Counseling- I discussed with the patient the risks of Odomzo including but not limited to nausea, vomiting, diarrhea, constipation, weight loss, changes in the sense of taste, decreased appetite, muscle spasms, and hair loss.  The patient verbalized understanding of the proper use and possible adverse effects of Odomzo.  All of the patient's questions and concerns were addressed.
Birth Control Pills Pregnancy And Lactation Text: This medication should be avoided if pregnant and for the first 30 days post-partum.
Oral Minoxidil Pregnancy And Lactation Text: This medication should only be used when clearly needed if you are pregnant, attempting to become pregnant or breast feeding.
Topical Steroids Counseling: I discussed with the patient that prolonged use of topical steroids can result in the increased appearance of superficial blood vessels (telangiectasias), lightening (hypopigmentation) and thinning of the skin (atrophy).  Patient understands to avoid using high potency steroids in skin folds, the groin or the face.  The patient verbalized understanding of the proper use and possible adverse effects of topical steroids.  All of the patient's questions and concerns were addressed.
Minoxidil Counseling: Minoxidil is a topical medication which can increase blood flow where it is applied. It is uncertain how this medication increases hair growth. Side effects are uncommon and include stinging and allergic reactions.
Enbrel Counseling:  I discussed with the patient the risks of etanercept including but not limited to myelosuppression, immunosuppression, autoimmune hepatitis, demyelinating diseases, lymphoma, and infections.  The patient understands that monitoring is required including a PPD at baseline and must alert us or the primary physician if symptoms of infection or other concerning signs are noted.
Xelalonz Pregnancy And Lactation Text: This medication is Pregnancy Category D and is not considered safe during pregnancy.  The risk during breast feeding is also uncertain.
Adbry Pregnancy And Lactation Text: It is unknown if this medication will adversely affect pregnancy or breast feeding.
Itraconazole Counseling:  I discussed with the patient the risks of itraconazole including but not limited to liver damage, nausea/vomiting, neuropathy, and severe allergy.  The patient understands that this medication is best absorbed when taken with acidic beverages such as non-diet cola or ginger ale.  The patient understands that monitoring is required including baseline LFTs and repeat LFTs at intervals.  The patient understands that they are to contact us or the primary physician if concerning signs are noted.
Rituxan Counseling:  I discussed with the patient the risks of Rituxan infusions. Side effects can include infusion reactions, severe drug rashes including mucocutaneous reactions, reactivation of latent hepatitis and other infections and rarely progressive multifocal leukoencephalopathy.  All of the patient's questions and concerns were addressed.
Zoryve Counseling:  I discussed with the patient that Zoryve is not for use in the eyes, mouth or vagina. The most commonly reported side effects include diarrhea, headache, insomnia, application site pain, upper respiratory tract infections, and urinary tract infections.  All of the patient's questions and concerns were addressed.
Cyclosporine Counseling:  I discussed with the patient the risks of cyclosporine including but not limited to hypertension, gingival hyperplasia,myelosuppression, immunosuppression, liver damage, kidney damage, neurotoxicity, lymphoma, and serious infections. The patient understands that monitoring is required including baseline blood pressure, CBC, CMP, lipid panel and uric acid, and then 1-2 times monthly CMP and blood pressure.
Low Dose Naltrexone Counseling- I discussed with the patient the potential risks and side effects of low dose naltrexone including but not limited to: more vivid dreams, headaches, nausea, vomiting, abdominal pain, fatigue, dizziness, and anxiety.
Rifampin Pregnancy And Lactation Text: This medication is Pregnancy Category C and it isn't know if it is safe during pregnancy. It is also excreted in breast milk and should not be used if you are breast feeding.
Litfulo Pregnancy And Lactation Text: Based on animal studies, Lifulo may cause embryo-fetal harm when administered to pregnant women.  The medication should not be used in pregnancy.  Breastfeeding is not recommended during treatment.
Doxepin Pregnancy And Lactation Text: This medication is Pregnancy Category C and it isn't known if it is safe during pregnancy. It is also excreted in breast milk and breast feeding isn't recommended.
Carac Counseling:  I discussed with the patient the risks of Carac including but not limited to erythema, scaling, itching, weeping, crusting, and pain.
Qbrexza Counseling:  I discussed with the patient the risks of Qbrexza including but not limited to headache, mydriasis, blurred vision, dry eyes, nasal dryness, dry mouth, dry throat, dry skin, urinary hesitation, and constipation.  Local skin reactions including erythema, burning, stinging, and itching can also occur.
Bactrim Pregnancy And Lactation Text: This medication is Pregnancy Category D and is known to cause fetal risk.  It is also excreted in breast milk.
Topical Steroids Applications Pregnancy And Lactation Text: Most topical steroids are considered safe to use during pregnancy and lactation.  Any topical steroid applied to the breast or nipple should be washed off before breastfeeding.
Erythromycin Counseling:  I discussed with the patient the risks of erythromycin including but not limited to GI upset, allergic reaction, drug rash, diarrhea, increase in liver enzymes, and yeast infections.
Tremfya Counseling: I discussed with the patient the risks of guselkumab including but not limited to immunosuppression, serious infections, worsening of inflammatory bowel disease and drug reactions.  The patient understands that monitoring is required including a PPD at baseline and must alert us or the primary physician if symptoms of infection or other concerning signs are noted.
Otezla Counseling: The side effects of Otezla were discussed with the patient, including but not limited to worsening or new depression, weight loss, diarrhea, nausea, upper respiratory tract infection, and headache. Patient instructed to call the office should any adverse effect occur.  The patient verbalized understanding of the proper use and possible adverse effects of Otezla.  All the patient's questions and concerns were addressed.
Tazorac Counseling:  Patient advised that medication is irritating and drying.  Patient may need to apply sparingly and wash off after an hour before eventually leaving it on overnight.  The patient verbalized understanding of the proper use and possible adverse effects of tazorac.  All of the patient's questions and concerns were addressed.
Eucrisa Counseling: Patient may experience a mild burning sensation during topical application. Eucrisa is not approved in children less than 2 years of age.
Bimzelx Counseling:  I discussed with the patient the risks of Bimzelx including but not limited to depression, immunosuppression, allergic reactions and infections.  The patient understands that monitoring is required including a PPD at baseline and must alert us or the primary physician if symptoms of infection or other concerning signs are noted.
Sarecycline Counseling: Patient advised regarding possible photosensitivity and discoloration of the teeth, skin, lips, tongue and gums.  Patient instructed to avoid sunlight, if possible.  When exposed to sunlight, patients should wear protective clothing, sunglasses, and sunscreen.  The patient was instructed to call the office immediately if the following severe adverse effects occur:  hearing changes, easy bruising/bleeding, severe headache, or vision changes.  The patient verbalized understanding of the proper use and possible adverse effects of sarecycline.  All of the patient's questions and concerns were addressed.
Spironolactone Counseling: Patient advised regarding risks of diarrhea, abdominal pain, hyperkalemia, birth defects (for female patients), liver toxicity and renal toxicity. The patient may need blood work to monitor liver and kidney function and potassium levels while on therapy. The patient verbalized understanding of the proper use and possible adverse effects of spironolactone.  All of the patient's questions and concerns were addressed.
Hydroxyzine Counseling: Patient advised that the medication is sedating and not to drive a car after taking this medication.  Patient informed of potential adverse effects including but not limited to dry mouth, urinary retention, and blurry vision.  The patient verbalized understanding of the proper use and possible adverse effects of hydroxyzine.  All of the patient's questions and concerns were addressed.
Aklief counseling:  Patient advised to apply a pea-sized amount only at bedtime and wait 30 minutes after washing their face before applying.  If too drying, patient may add a non-comedogenic moisturizer.  The most commonly reported side effects including irritation, redness, scaling, dryness, stinging, burning, itching, and increased risk of sunburn.  The patient verbalized understanding of the proper use and possible adverse effects of retinoids.  All of the patient's questions and concerns were addressed.
Qbrexza Pregnancy And Lactation Text: There is no available data on Qbrexza use in pregnant women.  There is no available data on Qbrexza use in lactation.
Low Dose Naltrexone Pregnancy And Lactation Text: Naltrexone is pregnancy category C.  There have been no adequate and well-controlled studies in pregnant women.  It should be used in pregnancy only if the potential benefit justifies the potential risk to the fetus.   Limited data indicates that naltrexone is minimally excreted into breastmilk.
Olumiant Counseling: I discussed with the patient the risks of Olumiant therapy including but not limited to upper respiratory tract infections, shingles, cold sores, and nausea. Live vaccines should be avoided.  This medication has been linked to serious infections; higher rate of mortality; malignancy and lymphoproliferative disorders; major adverse cardiovascular events; thrombosis; gastrointestinal perforations; neutropenia; lymphopenia; anemia; liver enzyme elevations; and lipid elevations.
Rituxan Pregnancy And Lactation Text: This medication is Pregnancy Category C and it isn't know if it is safe during pregnancy. It is unknown if this medication is excreted in breast milk but similar antibodies are known to be excreted.
Erythromycin Pregnancy And Lactation Text: This medication is Pregnancy Category B and is considered safe during pregnancy. It is also excreted in breast milk.
Tranexamic Acid Counseling:  Patient advised of the small risk of bleeding problems with tranexamic acid. They were also instructed to call if they developed any nausea, vomiting or diarrhea. All of the patient's questions and concerns were addressed.
Cephalexin Counseling: I counseled the patient regarding use of cephalexin as an antibiotic for prophylactic and/or therapeutic purposes. Cephalexin (commonly prescribed under brand name Keflex) is a cephalosporin antibiotic which is active against numerous classes of bacteria, including most skin bacteria. Side effects may include nausea, diarrhea, gastrointestinal upset, rash, hives, yeast infections, and in rare cases, hepatitis, kidney disease, seizures, fever, confusion, neurologic symptoms, and others. Patients with severe allergies to penicillin medications are cautioned that there is about a 10% incidence of cross-reactivity with cephalosporins. When possible, patients with penicillin allergies should use alternatives to cephalosporins for antibiotic therapy.
Acitretin Counseling:  I discussed with the patient the risks of acitretin including but not limited to hair loss, dry lips/skin/eyes, liver damage, hyperlipidemia, depression/suicidal ideation, photosensitivity.  Serious rare side effects can include but are not limited to pancreatitis, pseudotumor cerebri, bony changes, clot formation/stroke/heart attack.  Patient understands that alcohol is contraindicated since it can result in liver toxicity and significantly prolong the elimination of the drug by many years.
Mirvaso Counseling: Mirvaso is a topical medication which can decrease superficial blood flow where applied. Side effects are uncommon and include stinging, redness and allergic reactions.
Dapsone Counseling: I discussed with the patient the risks of dapsone including but not limited to hemolytic anemia, agranulocytosis, rashes, methemoglobinemia, kidney failure, peripheral neuropathy, headaches, GI upset, and liver toxicity.  Patients who start dapsone require monitoring including baseline LFTs and weekly CBCs for the first month, then every month thereafter.  The patient verbalized understanding of the proper use and possible adverse effects of dapsone.  All of the patient's questions and concerns were addressed.
Topical Sulfur Applications Counseling: Topical Sulfur Counseling: Patient counseled that this medication may cause skin irritation or allergic reactions.  In the event of skin irritation, the patient was advised to reduce the amount of the drug applied or use it less frequently.   The patient verbalized understanding of the proper use and possible adverse effects of topical sulfur application.  All of the patient's questions and concerns were addressed.
Humira Counseling:  I discussed with the patient the risks of adalimumab including but not limited to myelosuppression, immunosuppression, autoimmune hepatitis, demyelinating diseases, lymphoma, and serious infections.  The patient understands that monitoring is required including a PPD at baseline and must alert us or the primary physician if symptoms of infection or other concerning signs are noted.
Otezla Pregnancy And Lactation Text: This medication is Pregnancy Category C and it isn't known if it is safe during pregnancy. It is unknown if it is excreted in breast milk.

## 2024-07-16 NOTE — PROCEDURE: ADDITIONAL NOTES
Render Risk Assessment In Note?: no
Additional Notes: Patient recalls getting diagnosed by PCP Dr. Syed, was prescribed clobetasol solution, will refill per patient. Patient declines Zoryve foam today.
Detail Level: Simple

## 2024-12-05 ENCOUNTER — HOSPITAL ENCOUNTER (OUTPATIENT)
Dept: LAB | Facility: MEDICAL CENTER | Age: 89
End: 2024-12-05
Attending: STUDENT IN AN ORGANIZED HEALTH CARE EDUCATION/TRAINING PROGRAM
Payer: MEDICARE

## 2024-12-05 LAB
25(OH)D3 SERPL-MCNC: 46 NG/ML (ref 30–100)
ALBUMIN SERPL BCP-MCNC: 4.3 G/DL (ref 3.2–4.9)
ALBUMIN/GLOB SERPL: 1.8 G/DL
ALP SERPL-CCNC: 83 U/L (ref 30–99)
ALT SERPL-CCNC: 9 U/L (ref 2–50)
ANION GAP SERPL CALC-SCNC: 10 MMOL/L (ref 7–16)
AST SERPL-CCNC: 19 U/L (ref 12–45)
BASOPHILS # BLD AUTO: 1.2 % (ref 0–1.8)
BASOPHILS # BLD: 0.07 K/UL (ref 0–0.12)
BILIRUB SERPL-MCNC: 0.4 MG/DL (ref 0.1–1.5)
BUN SERPL-MCNC: 15 MG/DL (ref 8–22)
CALCIUM ALBUM COR SERPL-MCNC: 9.2 MG/DL (ref 8.5–10.5)
CALCIUM SERPL-MCNC: 9.4 MG/DL (ref 8.5–10.5)
CHLORIDE SERPL-SCNC: 105 MMOL/L (ref 96–112)
CHOLEST SERPL-MCNC: 226 MG/DL (ref 100–199)
CO2 SERPL-SCNC: 27 MMOL/L (ref 20–33)
CREAT SERPL-MCNC: 0.98 MG/DL (ref 0.5–1.4)
EOSINOPHIL # BLD AUTO: 0.08 K/UL (ref 0–0.51)
EOSINOPHIL NFR BLD: 1.4 % (ref 0–6.9)
ERYTHROCYTE [DISTWIDTH] IN BLOOD BY AUTOMATED COUNT: 44.2 FL (ref 35.9–50)
GFR SERPLBLD CREATININE-BSD FMLA CKD-EPI: 55 ML/MIN/1.73 M 2
GLOBULIN SER CALC-MCNC: 2.4 G/DL (ref 1.9–3.5)
GLUCOSE SERPL-MCNC: 91 MG/DL (ref 65–99)
HCT VFR BLD AUTO: 46.3 % (ref 37–47)
HDLC SERPL-MCNC: 74 MG/DL
HGB BLD-MCNC: 14.6 G/DL (ref 12–16)
IMM GRANULOCYTES # BLD AUTO: 0.01 K/UL (ref 0–0.11)
IMM GRANULOCYTES NFR BLD AUTO: 0.2 % (ref 0–0.9)
LDLC SERPL CALC-MCNC: 125 MG/DL
LYMPHOCYTES # BLD AUTO: 2 K/UL (ref 1–4.8)
LYMPHOCYTES NFR BLD: 34.8 % (ref 22–41)
MCH RBC QN AUTO: 27.4 PG (ref 27–33)
MCHC RBC AUTO-ENTMCNC: 31.5 G/DL (ref 32.2–35.5)
MCV RBC AUTO: 86.9 FL (ref 81.4–97.8)
MONOCYTES # BLD AUTO: 0.56 K/UL (ref 0–0.85)
MONOCYTES NFR BLD AUTO: 9.7 % (ref 0–13.4)
NEUTROPHILS # BLD AUTO: 3.03 K/UL (ref 1.82–7.42)
NEUTROPHILS NFR BLD: 52.7 % (ref 44–72)
NRBC # BLD AUTO: 0 K/UL
NRBC BLD-RTO: 0 /100 WBC (ref 0–0.2)
PLATELET # BLD AUTO: 321 K/UL (ref 164–446)
PMV BLD AUTO: 10.1 FL (ref 9–12.9)
POTASSIUM SERPL-SCNC: 4.4 MMOL/L (ref 3.6–5.5)
PROT SERPL-MCNC: 6.7 G/DL (ref 6–8.2)
RBC # BLD AUTO: 5.33 M/UL (ref 4.2–5.4)
SODIUM SERPL-SCNC: 142 MMOL/L (ref 135–145)
T3FREE SERPL-MCNC: 2.25 PG/ML (ref 2–4.4)
T4 FREE SERPL-MCNC: 1.53 NG/DL (ref 0.93–1.7)
THYROPEROXIDASE AB SERPL-ACNC: <9 IU/ML (ref 0–9)
TRIGL SERPL-MCNC: 133 MG/DL (ref 0–149)
TSH SERPL-ACNC: 2.39 UIU/ML (ref 0.35–5.5)
URATE SERPL-MCNC: 4.7 MG/DL (ref 1.9–8.2)
WBC # BLD AUTO: 5.8 K/UL (ref 4.8–10.8)

## 2024-12-05 PROCEDURE — 80061 LIPID PANEL: CPT

## 2024-12-05 PROCEDURE — 36415 COLL VENOUS BLD VENIPUNCTURE: CPT

## 2024-12-05 PROCEDURE — 86376 MICROSOMAL ANTIBODY EACH: CPT

## 2024-12-05 PROCEDURE — 80053 COMPREHEN METABOLIC PANEL: CPT

## 2024-12-05 PROCEDURE — 84550 ASSAY OF BLOOD/URIC ACID: CPT

## 2024-12-05 PROCEDURE — 82306 VITAMIN D 25 HYDROXY: CPT

## 2024-12-05 PROCEDURE — 84481 FREE ASSAY (FT-3): CPT

## 2024-12-05 PROCEDURE — 84439 ASSAY OF FREE THYROXINE: CPT

## 2024-12-05 PROCEDURE — 84443 ASSAY THYROID STIM HORMONE: CPT

## 2024-12-05 PROCEDURE — 85025 COMPLETE CBC W/AUTO DIFF WBC: CPT

## 2024-12-05 PROCEDURE — 86800 THYROGLOBULIN ANTIBODY: CPT

## 2024-12-06 LAB
THYROGLOB AB SERPL-ACNC: <0.9 IU/ML (ref 0–4)
THYROPEROXIDASE AB SERPL-ACNC: 0.9 IU/ML (ref 0–9)

## 2024-12-23 ENCOUNTER — APPOINTMENT (OUTPATIENT)
Dept: RADIOLOGY | Facility: MEDICAL CENTER | Age: 89
End: 2024-12-23
Payer: MEDICARE

## 2024-12-23 ENCOUNTER — HOSPITAL ENCOUNTER (OUTPATIENT)
Facility: MEDICAL CENTER | Age: 89
End: 2024-12-24
Attending: EMERGENCY MEDICINE | Admitting: INTERNAL MEDICINE
Payer: MEDICARE

## 2024-12-23 ENCOUNTER — APPOINTMENT (OUTPATIENT)
Dept: RADIOLOGY | Facility: MEDICAL CENTER | Age: 89
End: 2024-12-23
Attending: EMERGENCY MEDICINE
Payer: MEDICARE

## 2024-12-23 ENCOUNTER — APPOINTMENT (OUTPATIENT)
Dept: CARDIOLOGY | Facility: MEDICAL CENTER | Age: 89
End: 2024-12-23
Attending: INTERNAL MEDICINE
Payer: MEDICARE

## 2024-12-23 DIAGNOSIS — M25.511 CHRONIC RIGHT SHOULDER PAIN: ICD-10-CM

## 2024-12-23 DIAGNOSIS — R07.9 CHEST PAIN, UNSPECIFIED TYPE: ICD-10-CM

## 2024-12-23 DIAGNOSIS — G89.29 CHRONIC RIGHT SHOULDER PAIN: ICD-10-CM

## 2024-12-23 DIAGNOSIS — R03.0 ELEVATED BP WITHOUT DIAGNOSIS OF HYPERTENSION: ICD-10-CM

## 2024-12-23 LAB
ALBUMIN SERPL BCP-MCNC: 4.1 G/DL (ref 3.2–4.9)
ALBUMIN/GLOB SERPL: 1.5 G/DL
ALP SERPL-CCNC: 88 U/L (ref 30–99)
ALT SERPL-CCNC: 11 U/L (ref 2–50)
AMPHET UR QL SCN: NEGATIVE
ANION GAP SERPL CALC-SCNC: 10 MMOL/L (ref 7–16)
AST SERPL-CCNC: 21 U/L (ref 12–45)
BARBITURATES UR QL SCN: NEGATIVE
BASOPHILS # BLD AUTO: 0.7 % (ref 0–1.8)
BASOPHILS # BLD: 0.05 K/UL (ref 0–0.12)
BENZODIAZ UR QL SCN: NEGATIVE
BILIRUB SERPL-MCNC: 0.3 MG/DL (ref 0.1–1.5)
BUN SERPL-MCNC: 18 MG/DL (ref 8–22)
BZE UR QL SCN: NEGATIVE
CALCIUM ALBUM COR SERPL-MCNC: 9.5 MG/DL (ref 8.5–10.5)
CALCIUM SERPL-MCNC: 9.6 MG/DL (ref 8.5–10.5)
CANNABINOIDS UR QL SCN: NEGATIVE
CHLORIDE SERPL-SCNC: 104 MMOL/L (ref 96–112)
CHOLEST SERPL-MCNC: 234 MG/DL (ref 100–199)
CO2 SERPL-SCNC: 24 MMOL/L (ref 20–33)
CREAT SERPL-MCNC: 1.06 MG/DL (ref 0.5–1.4)
D DIMER PPP IA.FEU-MCNC: 0.33 UG/ML (FEU) (ref 0–0.5)
EKG IMPRESSION: NORMAL
EOSINOPHIL # BLD AUTO: 0.04 K/UL (ref 0–0.51)
EOSINOPHIL NFR BLD: 0.6 % (ref 0–6.9)
ERYTHROCYTE [DISTWIDTH] IN BLOOD BY AUTOMATED COUNT: 45.1 FL (ref 35.9–50)
EST. AVERAGE GLUCOSE BLD GHB EST-MCNC: 108 MG/DL
FENTANYL UR QL: NEGATIVE
GFR SERPLBLD CREATININE-BSD FMLA CKD-EPI: 50 ML/MIN/1.73 M 2
GLOBULIN SER CALC-MCNC: 2.8 G/DL (ref 1.9–3.5)
GLUCOSE SERPL-MCNC: 86 MG/DL (ref 65–99)
HBA1C MFR BLD: 5.4 % (ref 4–5.6)
HCT VFR BLD AUTO: 46.8 % (ref 37–47)
HDLC SERPL-MCNC: 85 MG/DL
HGB BLD-MCNC: 15.2 G/DL (ref 12–16)
IMM GRANULOCYTES # BLD AUTO: 0.02 K/UL (ref 0–0.11)
IMM GRANULOCYTES NFR BLD AUTO: 0.3 % (ref 0–0.9)
LDLC SERPL CALC-MCNC: 121 MG/DL
LYMPHOCYTES # BLD AUTO: 1.51 K/UL (ref 1–4.8)
LYMPHOCYTES NFR BLD: 22.4 % (ref 22–41)
MAGNESIUM SERPL-MCNC: 2.5 MG/DL (ref 1.5–2.5)
MCH RBC QN AUTO: 28.2 PG (ref 27–33)
MCHC RBC AUTO-ENTMCNC: 32.5 G/DL (ref 32.2–35.5)
MCV RBC AUTO: 86.8 FL (ref 81.4–97.8)
METHADONE UR QL SCN: NEGATIVE
MONOCYTES # BLD AUTO: 0.5 K/UL (ref 0–0.85)
MONOCYTES NFR BLD AUTO: 7.4 % (ref 0–13.4)
NEUTROPHILS # BLD AUTO: 4.63 K/UL (ref 1.82–7.42)
NEUTROPHILS NFR BLD: 68.6 % (ref 44–72)
NRBC # BLD AUTO: 0 K/UL
NRBC BLD-RTO: 0 /100 WBC (ref 0–0.2)
NT-PROBNP SERPL IA-MCNC: 77 PG/ML (ref 0–125)
OPIATES UR QL SCN: NEGATIVE
OXYCODONE UR QL SCN: NEGATIVE
PCP UR QL SCN: NEGATIVE
PLATELET # BLD AUTO: 298 K/UL (ref 164–446)
PMV BLD AUTO: 9.7 FL (ref 9–12.9)
POTASSIUM SERPL-SCNC: 4.8 MMOL/L (ref 3.6–5.5)
PROPOXYPH UR QL SCN: NEGATIVE
PROT SERPL-MCNC: 6.9 G/DL (ref 6–8.2)
RBC # BLD AUTO: 5.39 M/UL (ref 4.2–5.4)
SODIUM SERPL-SCNC: 138 MMOL/L (ref 135–145)
TRIGL SERPL-MCNC: 142 MG/DL (ref 0–149)
TROPONIN T SERPL-MCNC: 7 NG/L (ref 6–19)
TROPONIN T SERPL-MCNC: 7 NG/L (ref 6–19)
TROPONIN T SERPL-MCNC: 9 NG/L (ref 6–19)
TSH SERPL DL<=0.005 MIU/L-ACNC: 2.74 UIU/ML (ref 0.38–5.33)
WBC # BLD AUTO: 6.8 K/UL (ref 4.8–10.8)

## 2024-12-23 PROCEDURE — 84443 ASSAY THYROID STIM HORMONE: CPT

## 2024-12-23 PROCEDURE — 80307 DRUG TEST PRSMV CHEM ANLYZR: CPT

## 2024-12-23 PROCEDURE — 36415 COLL VENOUS BLD VENIPUNCTURE: CPT

## 2024-12-23 PROCEDURE — 83036 HEMOGLOBIN GLYCOSYLATED A1C: CPT

## 2024-12-23 PROCEDURE — A9270 NON-COVERED ITEM OR SERVICE: HCPCS | Performed by: INTERNAL MEDICINE

## 2024-12-23 PROCEDURE — 85025 COMPLETE CBC W/AUTO DIFF WBC: CPT

## 2024-12-23 PROCEDURE — G0378 HOSPITAL OBSERVATION PER HR: HCPCS

## 2024-12-23 PROCEDURE — 80061 LIPID PANEL: CPT

## 2024-12-23 PROCEDURE — 93005 ELECTROCARDIOGRAM TRACING: CPT | Mod: TC | Performed by: INTERNAL MEDICINE

## 2024-12-23 PROCEDURE — 99285 EMERGENCY DEPT VISIT HI MDM: CPT

## 2024-12-23 PROCEDURE — 99223 1ST HOSP IP/OBS HIGH 75: CPT | Performed by: INTERNAL MEDICINE

## 2024-12-23 PROCEDURE — 93005 ELECTROCARDIOGRAM TRACING: CPT | Mod: TC

## 2024-12-23 PROCEDURE — 700102 HCHG RX REV CODE 250 W/ 637 OVERRIDE(OP): Performed by: INTERNAL MEDICINE

## 2024-12-23 PROCEDURE — 83880 ASSAY OF NATRIURETIC PEPTIDE: CPT

## 2024-12-23 PROCEDURE — 83735 ASSAY OF MAGNESIUM: CPT

## 2024-12-23 PROCEDURE — 80053 COMPREHEN METABOLIC PANEL: CPT

## 2024-12-23 PROCEDURE — 93005 ELECTROCARDIOGRAM TRACING: CPT | Mod: TC | Performed by: EMERGENCY MEDICINE

## 2024-12-23 PROCEDURE — 85379 FIBRIN DEGRADATION QUANT: CPT

## 2024-12-23 PROCEDURE — 84484 ASSAY OF TROPONIN QUANT: CPT

## 2024-12-23 PROCEDURE — 71045 X-RAY EXAM CHEST 1 VIEW: CPT

## 2024-12-23 RX ORDER — ASPIRIN 325 MG
325 TABLET ORAL ONCE
Status: COMPLETED | OUTPATIENT
Start: 2024-12-23 | End: 2024-12-23

## 2024-12-23 RX ORDER — OXYCODONE HYDROCHLORIDE 5 MG/1
5 TABLET ORAL
Status: DISCONTINUED | OUTPATIENT
Start: 2024-12-23 | End: 2024-12-24 | Stop reason: HOSPADM

## 2024-12-23 RX ORDER — ASPIRIN 81 MG/1
81 TABLET ORAL DAILY
Status: DISCONTINUED | OUTPATIENT
Start: 2024-12-24 | End: 2024-12-24 | Stop reason: HOSPADM

## 2024-12-23 RX ORDER — ASPIRIN 300 MG/1
300 SUPPOSITORY RECTAL ONCE
Status: COMPLETED | OUTPATIENT
Start: 2024-12-23 | End: 2024-12-23

## 2024-12-23 RX ORDER — AMOXICILLIN 250 MG
2 CAPSULE ORAL EVERY EVENING
Status: DISCONTINUED | OUTPATIENT
Start: 2024-12-23 | End: 2024-12-24 | Stop reason: HOSPADM

## 2024-12-23 RX ORDER — OXYCODONE HYDROCHLORIDE 5 MG/1
2.5 TABLET ORAL
Status: DISCONTINUED | OUTPATIENT
Start: 2024-12-23 | End: 2024-12-24 | Stop reason: HOSPADM

## 2024-12-23 RX ORDER — REGADENOSON 0.08 MG/ML
0.4 INJECTION, SOLUTION INTRAVENOUS
Status: COMPLETED | OUTPATIENT
Start: 2024-12-23 | End: 2024-12-24

## 2024-12-23 RX ORDER — LABETALOL HYDROCHLORIDE 5 MG/ML
10 INJECTION, SOLUTION INTRAVENOUS EVERY 4 HOURS PRN
Status: DISCONTINUED | OUTPATIENT
Start: 2024-12-23 | End: 2024-12-24 | Stop reason: HOSPADM

## 2024-12-23 RX ORDER — LEVOTHYROXINE SODIUM 50 UG/1
100 TABLET ORAL
Status: DISCONTINUED | OUTPATIENT
Start: 2024-12-24 | End: 2024-12-24 | Stop reason: HOSPADM

## 2024-12-23 RX ORDER — AMINOPHYLLINE 25 MG/ML
100 INJECTION, SOLUTION INTRAVENOUS
Status: DISCONTINUED | OUTPATIENT
Start: 2024-12-23 | End: 2024-12-24 | Stop reason: HOSPADM

## 2024-12-23 RX ORDER — ALUMINA, MAGNESIA, AND SIMETHICONE 2400; 2400; 240 MG/30ML; MG/30ML; MG/30ML
30 SUSPENSION ORAL EVERY 4 HOURS PRN
Status: DISCONTINUED | OUTPATIENT
Start: 2024-12-23 | End: 2024-12-24 | Stop reason: HOSPADM

## 2024-12-23 RX ORDER — POLYETHYLENE GLYCOL 3350 17 G/17G
1 POWDER, FOR SOLUTION ORAL
Status: DISCONTINUED | OUTPATIENT
Start: 2024-12-23 | End: 2024-12-24 | Stop reason: HOSPADM

## 2024-12-23 RX ORDER — HYDROMORPHONE HYDROCHLORIDE 1 MG/ML
0.25 INJECTION, SOLUTION INTRAMUSCULAR; INTRAVENOUS; SUBCUTANEOUS
Status: DISCONTINUED | OUTPATIENT
Start: 2024-12-23 | End: 2024-12-24 | Stop reason: HOSPADM

## 2024-12-23 RX ORDER — ACETAMINOPHEN 325 MG/1
650 TABLET ORAL EVERY 6 HOURS PRN
Status: DISCONTINUED | OUTPATIENT
Start: 2024-12-23 | End: 2024-12-24 | Stop reason: HOSPADM

## 2024-12-23 RX ORDER — ONDANSETRON 4 MG/1
4 TABLET, ORALLY DISINTEGRATING ORAL EVERY 4 HOURS PRN
Status: DISCONTINUED | OUTPATIENT
Start: 2024-12-23 | End: 2024-12-24 | Stop reason: HOSPADM

## 2024-12-23 RX ORDER — ESTRADIOL 0.05 MG/D
1 PATCH TRANSDERMAL
COMMUNITY
Start: 2024-12-03

## 2024-12-23 RX ORDER — ASPIRIN 81 MG/1
324 TABLET, CHEWABLE ORAL ONCE
Status: COMPLETED | OUTPATIENT
Start: 2024-12-23 | End: 2024-12-23

## 2024-12-23 RX ORDER — NITROGLYCERIN 0.4 MG/1
0.4 TABLET SUBLINGUAL
Status: DISCONTINUED | OUTPATIENT
Start: 2024-12-23 | End: 2024-12-24 | Stop reason: HOSPADM

## 2024-12-23 RX ORDER — ONDANSETRON 2 MG/ML
4 INJECTION INTRAMUSCULAR; INTRAVENOUS EVERY 4 HOURS PRN
Status: DISCONTINUED | OUTPATIENT
Start: 2024-12-23 | End: 2024-12-24 | Stop reason: HOSPADM

## 2024-12-23 RX ORDER — ENOXAPARIN SODIUM 100 MG/ML
30 INJECTION SUBCUTANEOUS DAILY
Status: DISCONTINUED | OUTPATIENT
Start: 2024-12-23 | End: 2024-12-24 | Stop reason: HOSPADM

## 2024-12-23 RX ORDER — MORPHINE SULFATE 4 MG/ML
1-2 INJECTION INTRAVENOUS
Status: DISCONTINUED | OUTPATIENT
Start: 2024-12-23 | End: 2024-12-24 | Stop reason: HOSPADM

## 2024-12-23 RX ADMIN — SENNOSIDES AND DOCUSATE SODIUM 2 TABLET: 50; 8.6 TABLET ORAL at 18:00

## 2024-12-23 RX ADMIN — ASPIRIN 324 MG: 81 TABLET, CHEWABLE ORAL at 15:33

## 2024-12-23 RX ADMIN — OMEPRAZOLE 40 MG: 20 CAPSULE, DELAYED RELEASE ORAL at 15:35

## 2024-12-23 SDOH — ECONOMIC STABILITY: TRANSPORTATION INSECURITY
IN THE PAST 12 MONTHS, HAS THE LACK OF TRANSPORTATION KEPT YOU FROM MEDICAL APPOINTMENTS OR FROM GETTING MEDICATIONS?: NO

## 2024-12-23 SDOH — ECONOMIC STABILITY: TRANSPORTATION INSECURITY
IN THE PAST 12 MONTHS, HAS LACK OF RELIABLE TRANSPORTATION KEPT YOU FROM MEDICAL APPOINTMENTS, MEETINGS, WORK OR FROM GETTING THINGS NEEDED FOR DAILY LIVING?: NO

## 2024-12-23 ASSESSMENT — COPD QUESTIONNAIRES
HAVE YOU SMOKED AT LEAST 100 CIGARETTES IN YOUR ENTIRE LIFE: NO/DON'T KNOW
DURING THE PAST 4 WEEKS HOW MUCH DID YOU FEEL SHORT OF BREATH: SOME OF THE TIME
COPD SCREENING SCORE: 3
DO YOU EVER COUGH UP ANY MUCUS OR PHLEGM?: NO/ONLY WITH OCCASIONAL COLDS OR INFECTIONS

## 2024-12-23 ASSESSMENT — COGNITIVE AND FUNCTIONAL STATUS - GENERAL
MOBILITY SCORE: 24
SUGGESTED CMS G CODE MODIFIER DAILY ACTIVITY: CH
DAILY ACTIVITIY SCORE: 24
SUGGESTED CMS G CODE MODIFIER MOBILITY: CH

## 2024-12-23 ASSESSMENT — LIFESTYLE VARIABLES
AVERAGE NUMBER OF DAYS PER WEEK YOU HAVE A DRINK CONTAINING ALCOHOL: 0
TOTAL SCORE: 0
HOW MANY TIMES IN THE PAST YEAR HAVE YOU HAD 5 OR MORE DRINKS IN A DAY: 0
ON A TYPICAL DAY WHEN YOU DRINK ALCOHOL HOW MANY DRINKS DO YOU HAVE: 0
CONSUMPTION TOTAL: NEGATIVE
DOES PATIENT WANT TO STOP DRINKING: NO
TOTAL SCORE: 0
EVER HAD A DRINK FIRST THING IN THE MORNING TO STEADY YOUR NERVES TO GET RID OF A HANGOVER: NO
HAVE YOU EVER FELT YOU SHOULD CUT DOWN ON YOUR DRINKING: NO
EVER FELT BAD OR GUILTY ABOUT YOUR DRINKING: NO
TOTAL SCORE: 0
HAVE PEOPLE ANNOYED YOU BY CRITICIZING YOUR DRINKING: NO
ALCOHOL_USE: NO

## 2024-12-23 ASSESSMENT — SOCIAL DETERMINANTS OF HEALTH (SDOH)
IN THE PAST 12 MONTHS, HAS THE ELECTRIC, GAS, OIL, OR WATER COMPANY THREATENED TO SHUT OFF SERVICE IN YOUR HOME?: NO
WITHIN THE LAST YEAR, HAVE YOU BEEN KICKED, HIT, SLAPPED, OR OTHERWISE PHYSICALLY HURT BY YOUR PARTNER OR EX-PARTNER?: NO
WITHIN THE LAST YEAR, HAVE TO BEEN RAPED OR FORCED TO HAVE ANY KIND OF SEXUAL ACTIVITY BY YOUR PARTNER OR EX-PARTNER?: NO
WITHIN THE LAST YEAR, HAVE YOU BEEN HUMILIATED OR EMOTIONALLY ABUSED IN OTHER WAYS BY YOUR PARTNER OR EX-PARTNER?: NO
WITHIN THE PAST 12 MONTHS, YOU WORRIED THAT YOUR FOOD WOULD RUN OUT BEFORE YOU GOT THE MONEY TO BUY MORE: NEVER TRUE
WITHIN THE PAST 12 MONTHS, THE FOOD YOU BOUGHT JUST DIDN'T LAST AND YOU DIDN'T HAVE MONEY TO GET MORE: NEVER TRUE
WITHIN THE LAST YEAR, HAVE YOU BEEN AFRAID OF YOUR PARTNER OR EX-PARTNER?: NO

## 2024-12-23 ASSESSMENT — PATIENT HEALTH QUESTIONNAIRE - PHQ9
1. LITTLE INTEREST OR PLEASURE IN DOING THINGS: NOT AT ALL
2. FEELING DOWN, DEPRESSED, IRRITABLE, OR HOPELESS: NOT AT ALL
SUM OF ALL RESPONSES TO PHQ9 QUESTIONS 1 AND 2: 0

## 2024-12-23 ASSESSMENT — FIBROSIS 4 INDEX
FIB4 SCORE: 1.78
FIB4 SCORE: 1.91

## 2024-12-23 ASSESSMENT — PAIN DESCRIPTION - PAIN TYPE: TYPE: ACUTE PAIN

## 2024-12-23 NOTE — ED PROVIDER NOTES
ED Provider Note    CHIEF COMPLAINT  Chief Complaint   Patient presents with    Numbness     Pt reports to always have a small amount of numbness on left cheek. Yesterday numbness spread to across face and also have chest pressure in the middle and radiated to back. Pt reports that she has a pinched nerve in her neck that she knows of.     Chest Pain       EXTERNAL RECORDS REVIEWED  Outpatient Notes [place] lab    HPI/ROS  LIMITATION TO HISTORY   None  OUTSIDE HISTORIAN(S):  None    Deborah Odonnell is a 89 y.o. female who presents here for evaluation of chest pain.  Patient states she has had chest pain over the last day.  Intermittently.  She states that the pain is central chest, substernal, and does not radiate.  Patient has persistent and chronic numbness of the left side of her face, that is not new.  She has had this for many years.  She denies any fall or trauma, has no focal weakness or deficits.    PAST MEDICAL HISTORY   has a past medical history of CHEST PAIN (5/3/2011), Esophageal reflux disease (4/28/2011), Hypothyroidism (5/3/2011), Palpitations (5/25/2011), Post-menopause on HRT (hormone replacement therapy), SOB (shortness of breath) (5/3/2011), and Thyroid disease.    SURGICAL HISTORY   has a past surgical history that includes ptosis repair (1/22/2013).    FAMILY HISTORY  Family History   Problem Relation Age of Onset    Heart Attack Sister        SOCIAL HISTORY  Social History     Tobacco Use    Smoking status: Never     Passive exposure: Never    Smokeless tobacco: Never   Vaping Use    Vaping status: Never Used   Substance and Sexual Activity    Alcohol use: No    Drug use: No    Sexual activity: Not Currently       CURRENT MEDICATIONS  Home Medications       Reviewed by Guillermo Mora R.N. (Registered Nurse) on 12/23/24 at 1127  Med List Status: Not Addressed     Medication Last Dose Status   estradiol (ESTRACE) 0.1 MG/GM vaginal cream  Active   Estradiol 0.025 MG/24HR PATCH BIWEEKLY   "Active   levothyroxine (SYNTHROID) 100 MCG Tab  Active                  Audit from Redirected Encounters    **Home medications have not yet been reviewed for this encounter**         ALLERGIES  Allergies   Allergen Reactions    Tape        PHYSICAL EXAM  VITAL SIGNS: BP (!) 153/77   Pulse 74   Temp 36.1 °C (97 °F) (Temporal)   Resp 16   Ht 1.651 m (5' 5\")   Wt 63 kg (138 lb 14.2 oz)   SpO2 97%   BMI 23.11 kg/m²    Constitutional: Well developed, well nourished. No acute distress.  HEENT: Normocephalic, atraumatic. Posterior pharynx clear and moist.  Eyes:  EOMI. Normal sclera.  Neck: Supple, Full range of motion, nontender.  Chest/Pulmonary: clear to ausculation. Symmetrical expansion.   Cardio: Regular rate and rhythm with no murmur.   Abdomen: Soft, nontender. No peritoneal signs. No guarding. No palpable masses.  Musculoskeletal: No deformity, no edema, neurovascular intact.   Neuro: Clear speech, appropriate, cooperative, cranial nerves II-XII grossly intact.  Psych: Normal mood and affect      EKG/LABS  Results for orders placed or performed during the hospital encounter of 24   EKG    Collection Time: 24 11:20 AM   Result Value Ref Range    Report       [facility]    Test Date:  2024  Pt Name:    KONG WEAVER              Department: ER  MRN:        [id number]                      Room:  Gender:     Female                       Technician: [id number]  :        [id number]                   Requested By:ER TRIAGE PROTOCOL  Order #:    [id number]                    Reading MD:    Measurements  Intervals                                Axis  Rate:       69                           P:          69  ND:         184                          QRS:        38  QRSD:       90                           T:          43  QT:         402  QTc:        431    Interpretive Statements  Sinus rhythm  No previous ECG available for comparison     CBC with Differential    " Collection Time: 12/23/24 11:51 AM   Result Value Ref Range    WBC 6.8 4.8 - 10.8 K/uL    RBC 5.39 4.20 - 5.40 M/uL    Hemoglobin 15.2 12.0 - 16.0 g/dL    Hematocrit 46.8 37.0 - 47.0 %    MCV 86.8 81.4 - 97.8 fL    MCH 28.2 27.0 - 33.0 pg    MCHC 32.5 32.2 - 35.5 g/dL    RDW 45.1 35.9 - 50.0 fL    Platelet Count 298 164 - 446 K/uL    MPV 9.7 9.0 - 12.9 fL    Neutrophils-Polys 68.60 44.00 - 72.00 %    Lymphocytes 22.40 22.00 - 41.00 %    Monocytes 7.40 0.00 - 13.40 %    Eosinophils 0.60 0.00 - 6.90 %    Basophils 0.70 0.00 - 1.80 %    Immature Granulocytes 0.30 0.00 - 0.90 %    Nucleated RBC 0.00 0.00 - 0.20 /100 WBC    Neutrophils (Absolute) 4.63 1.82 - 7.42 K/uL    Lymphs (Absolute) 1.51 1.00 - 4.80 K/uL    Monos (Absolute) 0.50 0.00 - 0.85 K/uL    Eos (Absolute) 0.04 0.00 - 0.51 K/uL    Baso (Absolute) 0.05 0.00 - 0.12 K/uL    Immature Granulocytes (abs) 0.02 0.00 - 0.11 K/uL    NRBC (Absolute) 0.00 K/uL   Complete Metabolic Panel (CMP)    Collection Time: 12/23/24 11:51 AM   Result Value Ref Range    Sodium 138 135 - 145 mmol/L    Potassium 4.8 3.6 - 5.5 mmol/L    Chloride 104 96 - 112 mmol/L    Co2 24 20 - 33 mmol/L    Anion Gap 10.0 7.0 - 16.0    Glucose 86 65 - 99 mg/dL    Bun 18 8 - 22 mg/dL    Creatinine 1.06 0.50 - 1.40 mg/dL    Calcium 9.6 8.5 - 10.5 mg/dL    Correct Calcium 9.5 8.5 - 10.5 mg/dL    AST(SGOT) 21 12 - 45 U/L    ALT(SGPT) 11 2 - 50 U/L    Alkaline Phosphatase 88 30 - 99 U/L    Total Bilirubin 0.3 0.1 - 1.5 mg/dL    Albumin 4.1 3.2 - 4.9 g/dL    Total Protein 6.9 6.0 - 8.2 g/dL    Globulin 2.8 1.9 - 3.5 g/dL    A-G Ratio 1.5 g/dL   proBrain Natriuretic Peptide, NT (BNP)    Collection Time: 12/23/24 11:51 AM   Result Value Ref Range    NT-proBNP 77 0 - 125 pg/mL   Troponins NOW    Collection Time: 12/23/24 11:51 AM   Result Value Ref Range    Troponin T 7 6 - 19 ng/L   ESTIMATED GFR    Collection Time: 12/23/24 11:51 AM   Result Value Ref Range    GFR (CKD-EPI) 50 (A) >60 mL/min/1.73 m 2          RADIOLOGY/PROCEDURES   I have independently interpreted the diagnostic imaging associated with this visit and am waiting the final reading from the radiologist.   My preliminary interpretation is as follows: below     Radiologist interpretation:  DX-CHEST-PORTABLE (1 VIEW)    (Results Pending)       COURSE & MEDICAL DECISION MAKING    ASSESSMENT, COURSE AND PLAN  Care Narrative: This is a 89-year-old female here for evaluation of chest pain.  Patient's heart score of 4.  She has no acute findings at this time.  She has no vomiting, or fever or chills.        DISPOSITION AND DISCUSSIONS  I have discussed management of the patient with the following physicians and BAYLEE's: Hospitalist      FINAL DIAGNOSIS  Chest pain     Electronically signed by: Mariusz Carter D.O., 12/23/2024 1:59 PM

## 2024-12-23 NOTE — ED NOTES
Pt ambulated to room from [facility]. Changed into gown. Connected to monitor. Agree with triage note. Chart up for ERP. Call light in reach.

## 2024-12-23 NOTE — ED NOTES
Bedside report received by RN Rola    Oxygen:RA  Fall Risk status: bed in lowest position/locked, call light within reach  Patient Mentation:A+O x 4  Pending: admission bed

## 2024-12-23 NOTE — ED NOTES
Med Rec complete per patient   Allergies reviewed  Antibiotics in the past 30 days:no  Anticoagulant in past 14 days:no  Pharmacy patient utilizes:[address]    Pt states she receives Climara patch through [facility]

## 2024-12-23 NOTE — ASSESSMENT & PLAN NOTE
-Patient's blood pressure runs between 150-170s.  Does not appear to be on any home antihypertensives.  -Monitor blood pressure trend closely.  If persistently elevated may need to start on BP meds. Monitor blood pressure trend closely, start as needed IV labetalol for significant symptomatic hypertension.  Optimize blood pressure control.

## 2024-12-23 NOTE — ED TRIAGE NOTES
Chief Complaint   Patient presents with    Numbness     Pt reports to always have a small amount of numbness on left cheek. Yesterday numbness spread to across face and also have chest pressure in the middle and radiated to back. Pt reports that she has a pinched nerve in her neck that she knows of.     Chest Pain     Explained to pt triage process, made pt aware to tell this RN/staff of any changes/concerns, pt verbalized understanding of process and instructions given. Pt to ER suzy.

## 2024-12-23 NOTE — H&P
Hospital Medicine History & Physical Note    Date of Service  12/23/2024    Primary Care Physician  MITCHEL Herr.    Consultants  None    Code Status  Full Code    Chief Complaint  Chief Complaint   Patient presents with    Numbness     Pt reports to always have a small amount of numbness on left cheek. Yesterday numbness spread to across face and also have chest pressure in the middle and radiated to back. Pt reports that she has a pinched nerve in her neck that she knows of.     Chest Pain       History of Presenting Illness  Deborah Odonnell is a 89 y.o. female with GERD, hypothyroidism, who presented 12/23/2024 with 1 day of intermittent pressure-like central substernal chest pain since yesterday.  Patient also felt some ache between her shoulder blades.  Otherwise she had no other complaint such as shortness of breath, nausea, sweating.  She had no fevers, chills, abdominal pain, leg swelling, diarrhea, or urinary symptoms.    ED course:  On evaluation, she was noted to have elevated blood pressure up to the 170s.  Initial blood workup showed no leukocytosis.  Electrolytes and renal function were normal.  Troponin was negative and BNP was low.  Chest x-ray (personally reviewed) did not show any infiltrates or consolidation.  EKG (my read) showed normal sinus rhythm with no dynamic ischemic changes.  Patient subsequently admitted for further cardiac workup.    I personally reviewed all lab results mentioned above. Prior medical records from this institution and outside facilities were independently reviewed as noted. I also personally reviewed all ER physician and consultant recommendations and plans as documented above. History was independently obtained by myself. I discussed the plan of care with patient, family, bedside RN, charge RN, , and ERP (Dr. Carter) .    Review of Systems  ROS    Pertinent positives/negatives as mentioned above.     A complete review of systems was personally  done by me. All other systems were negative.       Past Medical History   has a past medical history of CHEST PAIN (5/3/2011), Esophageal reflux disease (4/28/2011), Hypothyroidism (5/3/2011), Palpitations (5/25/2011), Post-menopause on HRT (hormone replacement therapy), SOB (shortness of breath) (5/3/2011), and Thyroid disease.    Surgical History   has a past surgical history that includes ptosis repair (1/22/2013).     Family History  family history includes Heart Attack in her sister.     Social History   reports that she has never smoked. She has never been exposed to tobacco smoke. She has never used smokeless tobacco. She reports that she does not drink alcohol and does not use drugs.    Allergies  Allergies   Allergen Reactions    Tape        Medications  Prior to Admission Medications   Prescriptions Last Dose Informant Patient Reported? Taking?   Estradiol 0.025 MG/24HR PATCH BIWEEKLY   Yes No   Sig: Place 1 Patch on the skin every 7 days.   estradiol (ESTRACE) 0.1 MG/GM vaginal cream   No No   Sig: Use 1g per vagina nightly x 2 weeks then continue use 2x per week   levothyroxine (SYNTHROID) 100 MCG Tab   Yes No   Sig: Take 100 mcg by mouth every morning on an empty stomach.      Facility-Administered Medications: None       Physical Exam  Temp:  [36.1 °C (97 °F)] 36.1 °C (97 °F)  Pulse:  [65-74] 68  Resp:  [15-18] 18  BP: (153-178)/(77-79) 178/79  SpO2:  [93 %-97 %] 93 %  Blood Pressure : (!) 178/79   Temperature: 36.1 °C (97 °F)   Pulse: 68   Respiration: 18   Pulse Oximetry: 93 %       Physical Exam  Vitals reviewed.   Constitutional:       General: She is not in acute distress.     Appearance: Normal appearance. She is normal weight. She is not ill-appearing or diaphoretic.   HENT:      Head: Normocephalic and atraumatic.      Right Ear: External ear normal.      Left Ear: External ear normal.      Mouth/Throat:      Mouth: Mucous membranes are moist.      Pharynx: No oropharyngeal exudate or posterior  oropharyngeal erythema.   Eyes:      General: No scleral icterus.     Extraocular Movements: Extraocular movements intact.      Conjunctiva/sclera: Conjunctivae normal.      Pupils: Pupils are equal, round, and reactive to light.   Cardiovascular:      Rate and Rhythm: Normal rate and regular rhythm.      Heart sounds: Normal heart sounds. No murmur heard.  Pulmonary:      Effort: Pulmonary effort is normal. No respiratory distress.      Breath sounds: Normal breath sounds. No stridor. No wheezing, rhonchi or rales.   Chest:      Chest wall: No tenderness.   Abdominal:      General: Bowel sounds are normal. There is no distension.      Palpations: Abdomen is soft. There is no mass.      Tenderness: There is no abdominal tenderness. There is no guarding or rebound.   Musculoskeletal:         General: No swelling. Normal range of motion.      Cervical back: Normal range of motion and neck supple. No rigidity. No muscular tenderness.      Right lower leg: No edema.      Left lower leg: No edema.   Lymphadenopathy:      Cervical: No cervical adenopathy.   Skin:     General: Skin is warm and dry.      Coloration: Skin is not jaundiced.      Findings: No rash.   Neurological:      General: No focal deficit present.      Mental Status: She is alert and oriented to person, place, and time. Mental status is at baseline.      Cranial Nerves: No cranial nerve deficit.   Psychiatric:         Mood and Affect: Mood normal.         Behavior: Behavior normal.         Thought Content: Thought content normal.         Judgment: Judgment normal.         Laboratory:  Recent Labs     12/23/24  1151   WBC 6.8   RBC 5.39   HEMOGLOBIN 15.2   HEMATOCRIT 46.8   MCV 86.8   MCH 28.2   MCHC 32.5   RDW 45.1   PLATELETCT 298   MPV 9.7     Recent Labs     12/23/24  1151   SODIUM 138   POTASSIUM 4.8   CHLORIDE 104   CO2 24   GLUCOSE 86   BUN 18   CREATININE 1.06   CALCIUM 9.6     Recent Labs     12/23/24  1151   ALTSGPT 11   ASTSGOT 21   ALKPHOSPHAT  88   TBILIRUBIN 0.3   GLUCOSE 86         Recent Labs     12/23/24  1151   NTPROBNP 77         Recent Labs     12/23/24  1151 12/23/24  1415   TROPONINT 7 7       Imaging:  DX-CHEST-PORTABLE (1 VIEW)   Final Result      No acute cardiac or pulmonary abnormalities are identified.      EC-ECHOCARDIOGRAM COMPLETE W/O CONT    (Results Pending)   NM-CARDIAC STRESS TEST    (Results Pending)       Imaging studies and EKG results were independently reviewed as above.    Assessment/Plan:  Justification for Admission Status  I anticipate this patient is appropriate for observation status at this time because chest pain requiring further cardiac workup    Patient will need a Telemetry bed on MEDICAL service .  The need is secondary to chest pain.    * Chest pain- (present on admission)  Assessment & Plan  The ASCVD Risk score (Cali DK, et al., 2019) failed to calculate for the following reasons:    The 2019 ASCVD risk score is only valid for ages 40 to 79    -Reasonable to pursue further noninvasive cardiac workup.  - Admit to the CDU  -Start empiric aspirin for now until cardiac cause ruled out.  Check lipid profile and hemoglobin A1c for further risk stratification. We will do further cardiac monitoring to rule out arrhythmias.  -I will obtain serial troponins, and an echocardiogram.  If troponins remain negative, would proceed with nuclear cardiac stress test.  -Start as needed sublingual nitroglycerin, and morphine for pain recurrence.   -For completion, I will obtain a d-dimer, and if elevated will proceed with ruling out PE.  Check urine drug screen.  -For possibility of GI related chest pain, I will start her on daily Prilosec, along with PRN GI cocktail, and Maalox.    Elevated BP without diagnosis of hypertension- (present on admission)  Assessment & Plan  -Patient's blood pressure runs between 150-170s.  Does not appear to be on any home antihypertensives.  -Monitor blood pressure trend closely.  If persistently  elevated may need to start on BP meds. Monitor blood pressure trend closely, start as needed IV labetalol for significant symptomatic hypertension.  Optimize blood pressure control.    Hypothyroidism- (present on admission)  Assessment & Plan  -Resume home Synthroid.  Check TSH.        VTE prophylaxis: enoxaparin ppx

## 2024-12-23 NOTE — ASSESSMENT & PLAN NOTE
The ASCVD Risk score (Cali HERNANDEZ, et al., 2019) failed to calculate for the following reasons:    The 2019 ASCVD risk score is only valid for ages 40 to 79    -Reasonable to pursue further noninvasive cardiac workup.  - Admit to the CDU  -Start empiric aspirin for now until cardiac cause ruled out.  Check lipid profile and hemoglobin A1c for further risk stratification. We will do further cardiac monitoring to rule out arrhythmias.  -I will obtain serial troponins, and an echocardiogram.  If troponins remain negative, would proceed with nuclear cardiac stress test.  -Start as needed sublingual nitroglycerin, and morphine for pain recurrence.   -For completion, I will obtain a d-dimer, and if elevated will proceed with ruling out PE.  Check urine drug screen.  -For possibility of GI related chest pain, I will start her on daily Prilosec, along with PRN GI cocktail, and Maalox.

## 2024-12-24 ENCOUNTER — APPOINTMENT (OUTPATIENT)
Dept: RADIOLOGY | Facility: MEDICAL CENTER | Age: 89
End: 2024-12-24
Attending: INTERNAL MEDICINE
Payer: MEDICARE

## 2024-12-24 ENCOUNTER — APPOINTMENT (OUTPATIENT)
Dept: CARDIOLOGY | Facility: MEDICAL CENTER | Age: 89
End: 2024-12-24
Attending: INTERNAL MEDICINE
Payer: MEDICARE

## 2024-12-24 VITALS
WEIGHT: 138.45 LBS | DIASTOLIC BLOOD PRESSURE: 71 MMHG | HEART RATE: 72 BPM | HEIGHT: 65 IN | BODY MASS INDEX: 23.07 KG/M2 | SYSTOLIC BLOOD PRESSURE: 175 MMHG | RESPIRATION RATE: 18 BRPM | TEMPERATURE: 97.6 F | OXYGEN SATURATION: 96 %

## 2024-12-24 PROBLEM — I10 PRIMARY HYPERTENSION: Status: ACTIVE | Noted: 2024-12-23

## 2024-12-24 PROBLEM — I10 PRIMARY HYPERTENSION: Status: ACTIVE | Noted: 2024-12-24

## 2024-12-24 PROBLEM — R07.9 CHEST PAIN: Status: RESOLVED | Noted: 2024-12-23 | Resolved: 2024-12-24

## 2024-12-24 LAB
ANION GAP SERPL CALC-SCNC: 9 MMOL/L (ref 7–16)
BASOPHILS # BLD AUTO: 0.9 % (ref 0–1.8)
BASOPHILS # BLD: 0.06 K/UL (ref 0–0.12)
BUN SERPL-MCNC: 18 MG/DL (ref 8–22)
CALCIUM SERPL-MCNC: 9.2 MG/DL (ref 8.5–10.5)
CHLORIDE SERPL-SCNC: 106 MMOL/L (ref 96–112)
CO2 SERPL-SCNC: 25 MMOL/L (ref 20–33)
CREAT SERPL-MCNC: 1.11 MG/DL (ref 0.5–1.4)
EKG IMPRESSION: NORMAL
EOSINOPHIL # BLD AUTO: 0.15 K/UL (ref 0–0.51)
EOSINOPHIL NFR BLD: 2.2 % (ref 0–6.9)
ERYTHROCYTE [DISTWIDTH] IN BLOOD BY AUTOMATED COUNT: 43.7 FL (ref 35.9–50)
GFR SERPLBLD CREATININE-BSD FMLA CKD-EPI: 47 ML/MIN/1.73 M 2
GLUCOSE SERPL-MCNC: 90 MG/DL (ref 65–99)
HCT VFR BLD AUTO: 45.3 % (ref 37–47)
HGB BLD-MCNC: 14.4 G/DL (ref 12–16)
IMM GRANULOCYTES # BLD AUTO: 0.01 K/UL (ref 0–0.11)
IMM GRANULOCYTES NFR BLD AUTO: 0.1 % (ref 0–0.9)
LV EJECT FRACT MOD 2C 99903: 59.1
LV EJECT FRACT MOD 4C 99902: 55.3
LV EJECT FRACT MOD BP 99901: 56.65
LYMPHOCYTES # BLD AUTO: 2.03 K/UL (ref 1–4.8)
LYMPHOCYTES NFR BLD: 30.3 % (ref 22–41)
MCH RBC QN AUTO: 27.6 PG (ref 27–33)
MCHC RBC AUTO-ENTMCNC: 31.8 G/DL (ref 32.2–35.5)
MCV RBC AUTO: 86.8 FL (ref 81.4–97.8)
MONOCYTES # BLD AUTO: 0.64 K/UL (ref 0–0.85)
MONOCYTES NFR BLD AUTO: 9.6 % (ref 0–13.4)
NEUTROPHILS # BLD AUTO: 3.8 K/UL (ref 1.82–7.42)
NEUTROPHILS NFR BLD: 56.9 % (ref 44–72)
NRBC # BLD AUTO: 0 K/UL
NRBC BLD-RTO: 0 /100 WBC (ref 0–0.2)
PLATELET # BLD AUTO: 300 K/UL (ref 164–446)
PMV BLD AUTO: 9.8 FL (ref 9–12.9)
POTASSIUM SERPL-SCNC: 4.2 MMOL/L (ref 3.6–5.5)
RBC # BLD AUTO: 5.22 M/UL (ref 4.2–5.4)
SODIUM SERPL-SCNC: 140 MMOL/L (ref 135–145)
WBC # BLD AUTO: 6.7 K/UL (ref 4.8–10.8)

## 2024-12-24 PROCEDURE — 85025 COMPLETE CBC W/AUTO DIFF WBC: CPT

## 2024-12-24 PROCEDURE — 700102 HCHG RX REV CODE 250 W/ 637 OVERRIDE(OP): Performed by: INTERNAL MEDICINE

## 2024-12-24 PROCEDURE — A9270 NON-COVERED ITEM OR SERVICE: HCPCS | Performed by: INTERNAL MEDICINE

## 2024-12-24 PROCEDURE — A9502 TC99M TETROFOSMIN: HCPCS

## 2024-12-24 PROCEDURE — G0378 HOSPITAL OBSERVATION PER HR: HCPCS

## 2024-12-24 PROCEDURE — 80048 BASIC METABOLIC PNL TOTAL CA: CPT

## 2024-12-24 PROCEDURE — 93306 TTE W/DOPPLER COMPLETE: CPT | Mod: 26 | Performed by: STUDENT IN AN ORGANIZED HEALTH CARE EDUCATION/TRAINING PROGRAM

## 2024-12-24 PROCEDURE — 93306 TTE W/DOPPLER COMPLETE: CPT

## 2024-12-24 PROCEDURE — 99239 HOSP IP/OBS DSCHRG MGMT >30: CPT | Performed by: HOSPITALIST

## 2024-12-24 PROCEDURE — 700111 HCHG RX REV CODE 636 W/ 250 OVERRIDE (IP)

## 2024-12-24 RX ORDER — REGADENOSON 0.08 MG/ML
INJECTION, SOLUTION INTRAVENOUS
Status: COMPLETED
Start: 2024-12-24 | End: 2024-12-24

## 2024-12-24 RX ORDER — AMLODIPINE BESYLATE 5 MG/1
5 TABLET ORAL DAILY
Qty: 30 TABLET | Refills: 0 | Status: SHIPPED | OUTPATIENT
Start: 2024-12-25 | End: 2025-01-24

## 2024-12-24 RX ORDER — AMLODIPINE BESYLATE 5 MG/1
5 TABLET ORAL
Status: DISCONTINUED | OUTPATIENT
Start: 2024-12-24 | End: 2024-12-24 | Stop reason: HOSPADM

## 2024-12-24 RX ADMIN — ASPIRIN 81 MG: 81 TABLET, COATED ORAL at 05:18

## 2024-12-24 RX ADMIN — REGADENOSON 0.4 MG: 0.08 INJECTION, SOLUTION INTRAVENOUS at 08:05

## 2024-12-24 RX ADMIN — LEVOTHYROXINE SODIUM 100 MCG: 0.05 TABLET ORAL at 05:18

## 2024-12-24 NOTE — CARE PLAN
The patient is Stable - Low risk of patient condition declining or worsening    Shift Goals  Clinical Goals: tele monitoring, echo, stress test / NPO at midnight, monitoring BP  Patient Goals: get answers, rest  Family Goals: N/A    Progress made toward(s) clinical / shift goals:    Problem: Knowledge Deficit - Standard  Goal: Patient and family/care givers will demonstrate understanding of plan of care, disease process/condition, diagnostic tests and medications  Description: Target End Date:  1-3 days or as soon as patient condition allows    1.  Patient and family/caregiver oriented to unit, equipment, visitation policy and means for communicating concern  2.  Complete/review Learning Assessment  3.  Assess knowledge level of disease process/condition, treatment plan, diagnostic tests and medications  4.  Explain disease process/condition, treatment plan, diagnostic tests and medications  Outcome: Progressing     Problem: Pain - Standard  Goal: Alleviation of pain or a reduction in pain to the patient’s comfort goal  Description: Target End Date:  Prior to discharge or change in level of care    1.  Document pain using the appropriate pain scale per order or unit policy  2.  Educate and implement non-pharmacologic comfort measures (i.e. relaxation, distraction, massage, cold/heat therapy, etc.)  3.  Pain management medications as ordered  4.  Reassess pain after pain med administration per policy  5.  If opiods administered assess patient's response to pain medication is appropriate per POSS sedation scale  6.  Follow pain management plan developed in collaboration with patient and interdisciplinary team (including palliative care or pain specialists if applicable)  Outcome: Progressing     Problem: Optimal Care for the Acute Coronary Syndrome Patient  Goal: Optimal Care for the Acute Coronary Syndrome Patient  Description: Target End Date:  1 to 3 days  Outcome: Progressing     Problem: Hemodynamics  Goal:  Patient's hemodynamics, fluid balance and neurologic status will be stable or improve  Description: Target End Date:  Prior to discharge or change in level of care    1.  Monitor vital signs, pulse oximetry and cardiac monitor per provider order and/or policy  2.  Maintain blood pressure per provider order  3.  Hemodynamic monitoring per provider order  4.  Manage IV fluids and IV infusions  5.  Monitor intake and output  6.  Daily weights per unit policy or provider order  7.  Assess peripheral pulses and capillary refill  8.  Assess color and body temperature  9.  Position patient for maximum circulation/cardiac output  10. Monitor for signs/symptoms of excessive bleeding  11. Assess mental status, restlessness and changes in level of consciousness  12. Monitor temperature and report fever or hypothermia to provider immediately. Consideration of targeted temperature management.  Outcome: Progressing   Consistent Carbohydrate Diabetic Diets

## 2024-12-24 NOTE — ED NOTES
Pt requesting a new IV because her current one is hurting her when she bends her arm, this RN removed existing IV and placed a new on in the opposite arm as requested by the pt   negative

## 2024-12-24 NOTE — PROGRESS NOTES
Report received. Assumed care. Pt AAOx4. VSS. Denies any chest pain at this time, No other complaints reported; Pt was updated on POC, tele monitoring, echo, stress test / NPO at midnight, monitoring BP. White board updated, All question answered. Pt has call light within reach, bed is in the lowest position. Pt has no other needs at this time.

## 2024-12-24 NOTE — PROGRESS NOTES
4 Eyes Skin Assessment Completed.    Head WDL  Ears WDL  Nose WDL  Mouth partial dentures in place   Neck WDL  Breast/Chest WDL  Shoulder Blades WDL  Spine WDL  (R) Arm/Elbow/Hand Bruising and Swelling from PIV placement    (L) Arm/Elbow/Hand Scar  Abdomen WDL  Groin WDL  Scrotum/Coccyx/Buttocks Redness and Blanching  (R) Leg Scar  (L) Leg Scar  (R) Heel/Foot/Toe Redness, Blanching, and Scar  (L) Heel/Foot/Toe Redness, Blanching, and Scar          Devices In Places Tele Box and Pulse Ox      Interventions In Place Pillows    Possible Skin Injury No    Pictures Uploaded Into Epic N/A  Wound Consult Placed N/A  RN Wound Prevention Protocol Ordered No

## 2024-12-24 NOTE — DISCHARGE INSTRUCTIONS
Discharge Instructions per MITCHEL Ivory.     -A referral has been placed for physical therapy for further workup and management of your right shoulder.  -I prescribed amlodipine 5 mg p.o. daily.  Please take your blood pressure daily prior to taking this medication.     DIET: As tolerated     ACTIVITY: As tolerated     DIAGNOSIS: Chest pain     Return to ER if you experience any numbness and tingling, chest pain, shortness of breath.

## 2024-12-24 NOTE — HOSPITAL COURSE
Deborah Odonnell is a 89 y.o. female with GERD and hypothyroidism who presented 12/23/2024 with chest pain.    The patient states that she has had 1 day of intermittent pressure-like central substernal chest pain since yesterday.  Patient also felt some ache between her shoulder blades.  Otherwise she had no other complaint such as shortness of breath, nausea, sweating.  She had no fevers, chills, abdominal pain, leg swelling, diarrhea, or urinary symptoms.     ED course:  On evaluation, she was noted to have elevated blood pressure up to the 170s.  Initial blood workup showed no leukocytosis.  Electrolytes and renal function were normal.  Troponin was negative and BNP was low.  Chest x-ray (personally reviewed) did not show any infiltrates or consolidation.  EKG (my read) showed normal sinus rhythm with no dynamic ischemic changes.  Patient subsequently admitted for further cardiac workup.    Patient seen and examined this a.m.  She states that she has had no further chest pain.  Additionally patient states that she had a broken shoulder many years ago which she has seen a chiropractor for to assist in pain management.  Patient states that the pain in her shoulder was getting significantly worse prior to the chest pain started. Discussed with the patient that the chest pain could have been refractory from the shoulder in addition to some anxiety.  Additionally discussed with patient that she has been hypertensive based off of blood pressure in the 170s.  Patient is agreeable to having amlodipine prescribed and she will do further research on if she decides to take it outpatient.  Currently refusing any blood pressure medication while inpatient.  Discussed with the patient that risk of hypertensive urgency and further symptoms of chest pain could occur if she does not take blood pressure medication.  Patient expresses understanding.

## 2024-12-24 NOTE — PROGRESS NOTES
[facility] arrived to [facility].  Discussed discharge paperwork and medications with patient. Answered all questions. No home meds to return. Personal belongings in hand.

## 2024-12-24 NOTE — ED NOTES
Advanced tech at bedside to transport pt to [facility] [place], pt placed on portable monitor pt belongings (phone, clothing) at bedside with pt, pt on RA, VSS

## 2024-12-24 NOTE — PROGRESS NOTES
Monitor Summary:  Rhythm: SR  Rate: 61-65  Ectopy: rare PAC    0.20/0.10/0.41    Per monitor tech interpretation

## 2024-12-24 NOTE — ED NOTES
Pt resting on gurOUTSIDE THE [employer], connected to monitor, call light within reach, no needs at this time

## 2024-12-24 NOTE — PROGRESS NOTES
Pt admitted to -[place] at 1740. Pt oriented to room, unit, care team. Pt attached to tele box. POC discussed with patient, all questions answered at this time. Call light within reach; fall precautions in place. Denies any needs at this time

## 2024-12-25 NOTE — DISCHARGE SUMMARY
Discharge Summary    CHIEF COMPLAINT ON ADMISSION  Chief Complaint   Patient presents with    Numbness     Pt reports to always have a small amount of numbness on left cheek. Yesterday numbness spread to across face and also have chest pressure in the middle and radiated to back. Pt reports that she has a pinched nerve in her neck that she knows of.     Chest Pain       Reason for Admission  Chest Pain/Numbness     Admission Date  12/23/2024    CODE STATUS  Prior    HPI & HOSPITAL COURSE    Deborah Odonnell is a 89 y.o. female with GERD and hypothyroidism who presented 12/23/2024 with chest pain.  Patient was admitted to rule out for possible acute coronary syndrome.  She had an echocardiogram and a Persantine stress that did not show any evidence of any acute abnormalities.  Patient's blood pressure was elevated and may be the cause of patient's chest discomfort.    Advised to be on a low-salt diet.  She was advised to take the medication listed below for blood pressure control and she should follow-up with her PCP for the care and management    Therefore, she is discharged in good and stable condition to home with close outpatient follow-up.    The patient recovered much more quickly than anticipated on admission.    Discharge Date  12/24/2024    FOLLOW UP ITEMS POST DISCHARGE      DISCHARGE DIAGNOSES  Principal Problem (Resolved):    Chest pain (POA: Yes)  Active Problems:    Hypothyroidism (Chronic) (POA: Yes)    Primary hypertension (POA: Yes)      FOLLOW UP  No future appointments.  LIZANDRO HerrRAvaN.  [address] [facility] [id number]  [phone]    Follow up in 1 week(s)        MEDICATIONS ON DISCHARGE     Medication List        START taking these medications        Instructions   amLODIPine 5 MG Tabs  Start taking on: December 25, 2024  Commonly known as: Norvasc   Take 1 Tablet by mouth every day for 30 days.  Dose: 5 mg            CONTINUE taking these medications        Instructions   Climara  0.05 MG/24HR Ptwk  Generic drug: estradiol   Place 1 Patch on the skin every 7 days. From Carrie  Dose: 1 Patch     estradiol 0.1 MG/GM vaginal cream  Commonly known as: Estrace   Use 1g per vagina nightly x 2 weeks then continue use 2x per week     Synthroid 100 MCG Tabs  Generic drug: levothyroxine   Take 100 mcg by mouth every morning on an empty stomach.  Dose: 100 mcg              Allergies  Allergies   Allergen Reactions    Tape        DIET  No orders of the defined types were placed in this encounter.      ACTIVITY  As tolerated.  Weight bearing as tolerated    CONSULTATIONS      PROCEDURES  Stress test    echo    LABORATORY  Lab Results   Component Value Date    SODIUM 140 12/24/2024    POTASSIUM 4.2 12/24/2024    CHLORIDE 106 12/24/2024    CO2 25 12/24/2024    GLUCOSE 90 12/24/2024    BUN 18 12/24/2024    CREATININE 1.11 12/24/2024        Lab Results   Component Value Date    WBC 6.7 12/24/2024    HEMOGLOBIN 14.4 12/24/2024    HEMATOCRIT 45.3 12/24/2024    PLATELETCT 300 12/24/2024        Total time of the discharge process exceeds 37 minutes.

## 2025-01-23 ENCOUNTER — PHYSICAL THERAPY (OUTPATIENT)
Dept: PHYSICAL THERAPY | Facility: REHABILITATION | Age: OVER 89
End: 2025-01-23
Payer: MEDICARE

## 2025-01-23 DIAGNOSIS — G89.29 CHRONIC RIGHT SHOULDER PAIN: ICD-10-CM

## 2025-01-23 DIAGNOSIS — M25.511 CHRONIC RIGHT SHOULDER PAIN: ICD-10-CM

## 2025-01-23 DIAGNOSIS — M54.6 THORACIC SPINE PAIN: ICD-10-CM

## 2025-01-23 DIAGNOSIS — M54.2 NECK PAIN: ICD-10-CM

## 2025-01-23 PROCEDURE — 97110 THERAPEUTIC EXERCISES: CPT | Performed by: PHYSICAL THERAPIST

## 2025-01-23 PROCEDURE — 97163 PT EVAL HIGH COMPLEX 45 MIN: CPT | Performed by: PHYSICAL THERAPIST

## 2025-01-23 SDOH — ECONOMIC STABILITY: GENERAL: QUALITY OF LIFE: GOOD

## 2025-01-23 ASSESSMENT — ENCOUNTER SYMPTOMS
ALLEVIATING FACTORS: ACTIVITY
PAIN TIMING: INTERMITTENT
QUALITY: TIGHTNESS
ALLEVIATING FACTORS: POSITION CHANGE
EXACERBATED BY: OVERHEAD ACTIVITY
ALLEVIATING FACTORS: REST
EXACERBATED BY: CARRYING
QUALITY: NUMBNESS
EXACERBATED BY: ACTIVITY
EXACERBATED BY: LIFTING
ALLEVIATING FACTORS: STRETCHING
QUALITY: ACHING
EXACERBATED BY: PROLONGED SITTING

## 2025-01-23 NOTE — OP THERAPY EVALUATION
Outpatient Physical Therapy  INITIAL EVALUATION    [facility] Physical [place] [address].  [address]  [facility] [address]  Phone:  [phone]  Fax:  [phone]    Date of Evaluation: 01/23/2025    Patient: Deborah Odonnell  YOB: 1934  MRN: [id number]     Referring Provider: CONSTANZA Jean  [address] [place] [address]   Referring Diagnosis Chronic right shoulder pain [M25.511, G89.29]     Time Calculation  Start time: 0930  Stop time: 1015 Time Calculation (min): 45 minutes     Chief Complaint: Shoulder Problem, Neck Problem, and Back Problem    Visit Diagnoses     ICD-10-CM   1. Chronic right shoulder pain  M25.511    G89.29   2. Neck pain  M54.2   3. Thoracic spine pain  M54.6       Date of onset of impairment: 1/23/2005    Subjective:   History of Present Illness:     Date of onset:  1/23/2005    Mechanism of injury:  Deborah reports that her neck and upper back have been bothering her for about 20 years. She fell off a ladder in [id number], which resulted in at least a fractured right shoulder. Since that time she has also had pain in her neck and thoracic spine.     She feels stiffness in her neck and a constant ache from the middle of her upper back. She does have what was diagnosed as peripheral neuropathy which usually goes down her L side. She feels numbness in her left cheek and down the left side of her body. Deborah reports that this started after she fell from the ladder. Several tests were done and nothing was ever identified. She reports that she did have an x-ray through the mouth but no MRI or CT.    She is mostly bothered by the pain in the her middle back.    Deborah is an avid reader and does get pain with this. She was recently gifted a bookstand which she hasn't used yet.      Quality of life:  Good  Pain:     Quality:  Aching, tightness and numbness    Pain timing:  Intermittent    Relieving factors:  Rest, position change, stretching and  activity    Aggravating factors:  Prolonged sitting, activity, carrying, lifting and overhead activity  Hand dominance:  Right  Patient Goals:     Patient goals for therapy:  Decreased pain, increased strength, increased motion and return to sport/leisure activities    Other patient goals:  Decrease numbness      Past Medical History:   Diagnosis Date    CHEST PAIN 5/3/2011    Esophageal reflux disease 4/28/2011    Hypothyroidism 5/3/2011    Palpitations 5/25/2011    Post-menopause on HRT (hormone replacement therapy)     SOB (shortness of breath) 5/3/2011    Thyroid disease      Past Surgical History:   Procedure Laterality Date    PTOSIS REPAIR  1/22/2013    Performed by Clement Elliott M.D. at [facility]     Social History     Tobacco Use    Smoking status: Never     Passive exposure: Never    Smokeless tobacco: Never   Substance Use Topics    Alcohol use: No     Family and Occupational History     Socioeconomic History    Marital status:      Spouse name: Not on file    Number of children: Not on file    Years of education: Not on file    Highest education level: Not on file   Occupational History    Not on file       Objective     Postural Observations  Seated posture: fair  Standing posture: fair  Correction of posture: makes symptoms better      Neurological Testing     Reflexes   Left   Triceps (C7): normal (2+)    Right   Triceps (C7): normal (2+)    Palpation   Left   Hypertonic in the scalenes, sternocleidomastoid and upper trapezius.   Tenderness of the cervical paraspinals, scalenes, sternocleidomastoid and upper trapezius.     Right   Hypertonic in the scalenes, sternocleidomastoid and upper trapezius.   Tenderness of the cervical paraspinals, scalenes, sternocleidomastoid and upper trapezius.     Active Range of Motion     Cervical Spine   Flexion: Active cervical flexion: 45.  Extension: Active cervical extension: 45.  Retraction: decreased  Left lateral flexion: decreased  "(25)  Right lateral flexion: Active right cervical lateral flexion: 22.  Left rotation: decreased (50)  Right rotation: Active right cervical rotation: 43.  Left Shoulder   Flexion: 155 degrees   Abduction: 180 degrees   External rotation BTH: T3   Internal rotation BTB: T7     Right Shoulder   Flexion: 150 degrees   Abduction: 170 degrees   External rotation BTH: T3   Internal rotation BTB: T5     Scapular Mobility   Left Shoulder   Scapular mobility: WFL    Right Shoulder   Scapular mobility: WFL    Joint Play   Spine     [place]        C3: WFL       C4: WFL       C5: WFL       C6: WFL       C7: WFL       C8: WFL       T1: hypomobile       T2: hypomobile       T3: hypomobile       T4: hypomobile       T5: hypomobile       T6: hypomobile       T7: hypomobile       T8: hypomobile      Strength:    Cervical Spine   Deep neck flexors: 3+  Deep neck extensors: 3+    Left Shoulder   Planes of Motion   Flexion: 4-   Extension: 4   Abduction: 4-   Adduction: 4   External rotation at 0°: 4-   Internal rotation at 0°: 4   Horizontal abduction: 3   Isolated Muscles   Trapezius (lower): 3   Trapezius (middle): 3     Right Shoulder   Planes of Motion   Flexion: 4-   Extension: 4   Abduction: 4-   Adduction: 4   External rotation at 0°: 4-   Internal rotation at 0°: 4   Horizontal abduction: 3   Isolated Muscles   Trapezius (lower): 3   Trapezius (middle): 3     Additional Strength Details   strength R: 20#, L: 25#    Tests   Cervical spine     Left Spine   Negative vertebral artery test.     Right spine   Negative vertebral artery test.     Left Shoulder   Negative Spurling's sign, ULTT2, ULTT3 and ULTT4.     Right Shoulder   Negative Spurling's sign, ULTT2, ULTT3 and ULTT4.         Therapeutic Exercises (CPT 65288):     1. anterior scalene stretch, 2' hold    2. posterior scalene stretch, 2' hold    3. SL open book, 1\" holds    4. shoulder roll    5. row, blue TB    6. bilateral external rotation, pink TB      " Therapeutic Exercise Summary: HEP: anterior and posterior scalene stretch, SL open book, shoulder rolls, row, bilateral external rotation    Time-based treatments/modalities:    Physical Therapy Timed Treatment Charges  Therapeutic exercise minutes (CPT 77306): 8 minutes      Assessment, Response and Plan:   Impairments: abnormal or restricted ROM, impaired physical strength, lacks appropriate home exercise program and pain with function    Assessment details:  Patient is a 89 yo female c c/o chronic neck and back pain which began about 20 years ago when she fell off a ladder which resulted in a RUE fracture. Since that time, she has also had persistent numbness in the L side of her body from her face down to her LLE. Deborah reports that she has gotten somewhat used to this and recognizes that it may not go away. Her bigger complaint is intermittent stiffness and ache from her mid back up through her neck, which waxes and wanes with activity. Right shoulder is not really a problem for her. Patient reports that she had radiographs taken years ago which are not currently available to clinic.    Patient presentation at this time consistent with myofascial etiology to majority of her pain in thoracic and cervical region. Along with this, she presents with contributing deficits to regional flexibility and strength. Deborah does also present with symptoms of peripheral neuropathy, which patient reports she has been diagnosed with. Deborah may benefit from further diagnostics beyond the scope of PT, such as further imaging, to address some of her facial and extremity numbness if this continues to be a problem for her. She will benefit from a comprehensive POC and HEP in skilled PT to address her deficits, decrease pain and paresthesia, and improve overall function.    I have requested an updated script from Deborah's PCP which will include cervical and thoracic pain in addition to shoulder for comprehensive treatment of  patient's complaints.  Other barriers to therapy:  Chronicity of condition  Prognosis: fair    Goals:   Short Term Goals:   1. Patient will demonstrate independent regular performance of tailored home exercise program in order to facilitate recovery and promote self treatment and patient ownership of recovery   2. Patient will demo normal flexibility to bilateral SCM in order to enable improved Cx rotation AROM  3. Patient will demo bilateral Cx rotation AROM >/= 55 deg to enable safe scanning of environment when ambulating  4. Patient will demo DNF strength >/= 4-/5 in order to facilitate improved cervicothoracic postural control throughout the day   Short term goal time span:  2-4 weeks      Long Term Goals:    1. Patient will demo bilateral shoulder rhomboid and mid trap strength >/= 4-/5 to facilitate improve scapular control and normalized scapulohumeral rhythm  2. Patient will report ability to read as usual for at least 1 week without discomfort in Cx and Tx spine above 3/10  3. FUTURE GOAL (pertaining to outcome measure for neck or back) once we receive updated script for these regions  Long term goal time span:  6-8 weeks    Plan:   Planned therapy interventions:  Neuromuscular Re-education (CPT 21345), Manual Therapy (CPT 14904), Therapeutic Exercise (CPT 82925) and Therapeutic Activities (CPT 46402)  Frequency:  1x week  Duration in weeks:  8  Discussed with:  Patient      Functional Assessment Used  Oswestry Low Back Pain Disability Total Score: 20     Referring provider co-signature:  I have reviewed this plan of care and my co-signature certifies the need for services.    Certification Period: 01/23/2025 to  03/20/25    Physician Signature: ________________________________ Date: ______________

## 2025-02-05 ENCOUNTER — PHYSICAL THERAPY (OUTPATIENT)
Dept: PHYSICAL THERAPY | Facility: REHABILITATION | Age: OVER 89
End: 2025-02-05
Payer: MEDICARE

## 2025-02-05 DIAGNOSIS — M54.2 CERVICALGIA: ICD-10-CM

## 2025-02-05 PROCEDURE — 97110 THERAPEUTIC EXERCISES: CPT | Performed by: PHYSICAL THERAPIST

## 2025-02-05 PROCEDURE — 97140 MANUAL THERAPY 1/> REGIONS: CPT | Performed by: PHYSICAL THERAPIST

## 2025-02-05 NOTE — OP THERAPY DAILY TREATMENT
"  Outpatient Physical Therapy  DAILY TREATMENT     [facility] Physical [address].  [address]  [place] [address]  Phone:  [phone]  Fax:  [phone]    Date: 02/05/2025    Patient: Deborah Odonnell  YOB: 1934  MRN: [id number]     Time Calculation    Start time: 0945  Stop time: 1026 Time Calculation (min): 41 minutes     Chief Complaint: Neck Problem    Visit #: 2    SUBJECTIVE:  Deborah reports that she was able to try her initial home exercises and didn't have any issues with them. She notes that the neck stretches felt very effective.    OBJECTIVE:  Current objective measures: Patient seen for first time follow up with presentation consistent with initial evaluation          Therapeutic Exercises (CPT 42132):     1. UBE w/u, 5' FWD level 2.5    2. HL T, pink TB 2x to fatigue    3. Thread the needle EOT, 1x to fatigue ea    4. straight arm pulldown, green TB x2 to fatigue    5. PB up the wall, x2 to fatigue,1\" holds      Therapeutic Exercise Summary: HEP: anterior and posterior scalene stretch, SL open book, shoulder rolls, row, bilateral external rotation    Therapeutic Treatments and Modalities:     1. Manual Therapy (CPT 41437), see below    Therapeutic Treatment and Modalities Summary: Manual: MFR to thoracic and cervical paraspinals, cervical stretches, MFR to suboccipitals and SCM, gr 2 PA JMs T3-T8    Time-based treatments/modalities:    Physical Therapy Timed Treatment Charges  Manual therapy minutes (CPT 86658): 25 minutes  Therapeutic exercise minutes (CPT 01053): 16 minutes    ASSESSMENT:   Response to treatment: Good initial response to therapy from Deborah. She only required min verbal and tactile cueing and did not experience any increase in discomfort with manual therapy or initiation of light mobility and strengthening. She will continue to benefit from skilled PT intervention.    PLAN/RECOMMENDATIONS:   Plan for treatment: therapy treatment to continue " next visit.  Planned interventions for next visit: continue with current treatment.

## 2025-02-14 ENCOUNTER — APPOINTMENT (OUTPATIENT)
Dept: PHYSICAL THERAPY | Facility: REHABILITATION | Age: OVER 89
End: 2025-02-14
Payer: MEDICARE

## 2025-02-21 ENCOUNTER — PHYSICAL THERAPY (OUTPATIENT)
Dept: PHYSICAL THERAPY | Facility: REHABILITATION | Age: OVER 89
End: 2025-02-21
Payer: MEDICARE

## 2025-02-21 DIAGNOSIS — M25.511 CHRONIC RIGHT SHOULDER PAIN: ICD-10-CM

## 2025-02-21 DIAGNOSIS — M54.2 CERVICALGIA: ICD-10-CM

## 2025-02-21 DIAGNOSIS — G89.29 CHRONIC RIGHT SHOULDER PAIN: ICD-10-CM

## 2025-02-21 DIAGNOSIS — M54.6 THORACIC SPINE PAIN: ICD-10-CM

## 2025-02-21 PROCEDURE — 97140 MANUAL THERAPY 1/> REGIONS: CPT | Performed by: PHYSICAL THERAPIST

## 2025-02-21 PROCEDURE — 97110 THERAPEUTIC EXERCISES: CPT | Performed by: PHYSICAL THERAPIST

## 2025-02-21 NOTE — OP THERAPY DAILY TREATMENT
"  Outpatient Physical Therapy  DAILY TREATMENT     [facility] Physical [address].  [address]  [place] [address]  Phone:  [phone]  Fax:  [phone]    Date: 02/21/2025    Patient: Deborah Odonnell  YOB: 1934  MRN: [id number]     Time Calculation    Start time: 0945  Stop time: 1030 Time Calculation (min): 45 minutes     Chief Complaint: Neck Problem, Back Problem, and Shoulder Problem    Visit #: 3    SUBJECTIVE:  Deborah reports continued improvement.    OBJECTIVE:  Current objective measures: Mod hypomobility of thoracic vertebrae. Mild chest discomfort with prone positioning and Tx JPA          Therapeutic Exercises (CPT 28745):     1. UBE w/u, 5' FWD level 2.5    2. HL T, pink TB 2x to fatigue    3. Thread the needle EOT, 1x to fatigue ea    4. straight arm pulldown, green TB x2 to fatigue    5. PB up the wall, x2 to fatigue,1\" holds      Therapeutic Exercise Summary: HEP: anterior and posterior scalene stretch, SL open book, shoulder rolls, row, bilateral external rotation    Therapeutic Treatments and Modalities:     1. Manual Therapy (CPT 00900), see below    Therapeutic Treatment and Modalities Summary: Manual: MFR to thoracic and cervical paraspinals, cervical stretches, MFR to suboccipitals and SCM, gr 2 PA JMs T3-T8    Time-based treatments/modalities:    Physical Therapy Timed Treatment Charges  Manual therapy minutes (CPT 63237): 35 minutes  Therapeutic exercise minutes (CPT 49022): 10 minutes      ASSESSMENT:   Response to treatment: Chest pain with prone positioning myofascial in nature. Utilized pillow under Deborah to improve with resolution of discomfort. She continued to present with flexibility and joint mobility restrictions and will continue to benefit from manual therapy emphasis to improve flexibility and mobility before progressing strengthening.     PLAN/RECOMMENDATIONS:   Plan for treatment: therapy treatment to continue next visit.  Planned " interventions for next visit: continue with current treatment.

## 2025-02-28 ENCOUNTER — APPOINTMENT (OUTPATIENT)
Dept: PHYSICAL THERAPY | Facility: REHABILITATION | Age: OVER 89
End: 2025-02-28
Payer: MEDICARE

## 2025-03-07 ENCOUNTER — PHYSICAL THERAPY (OUTPATIENT)
Dept: PHYSICAL THERAPY | Facility: REHABILITATION | Age: OVER 89
End: 2025-03-07
Payer: MEDICARE

## 2025-03-07 DIAGNOSIS — G89.29 CHRONIC RIGHT SHOULDER PAIN: ICD-10-CM

## 2025-03-07 DIAGNOSIS — M25.511 CHRONIC RIGHT SHOULDER PAIN: ICD-10-CM

## 2025-03-07 PROCEDURE — 97110 THERAPEUTIC EXERCISES: CPT | Performed by: PHYSICAL THERAPIST

## 2025-03-07 PROCEDURE — 97140 MANUAL THERAPY 1/> REGIONS: CPT | Performed by: PHYSICAL THERAPIST

## 2025-03-07 NOTE — OP THERAPY DAILY TREATMENT
"  Outpatient Physical Therapy  DAILY TREATMENT     [facility] Physical [address].  [address]  [place] [address]  Phone:  [phone]  Fax:  [phone]    Date: 03/07/2025    Patient: Deborah Odonnell  YOB: 1934  MRN: [id number]     Time Calculation    Start time: 0945  Stop time: 1029 Time Calculation (min): 44 minutes     Chief Complaint: Shoulder Problem    Visit #: 4    SUBJECTIVE:  Deborah reports that she is beginning to feel some improvement    OBJECTIVE:  Current objective measures: NT          Therapeutic Exercises (CPT 75759):     1. UBE w/u, 5' FWD level 2.5    2. HL T, pink TB 2x to fatigue    3. Thread the needle EOT, 1x to fatigue ea    4. straight arm pulldown, green TB x2 to fatigue    5. PB up the wall, x2 to fatigue,1\" holds    6. unilateral row with trunk rotation, green TB 2x to fatigue ea    7. scap protraction punch with trunk rotation, green TB 2x to fatigue ea      Therapeutic Exercise Summary: HEP: anterior and posterior scalene stretch, SL open book, shoulder rolls, row, bilateral external rotation    Therapeutic Treatments and Modalities:     1. Manual Therapy (CPT 43825), see below    Therapeutic Treatment and Modalities Summary: Manual: MFR to thoracic and cervical paraspinals, cervical stretches, MFR to suboccipitals and SCM, gr 2 PA JMs T3-T8    Time-based treatments/modalities:    Physical Therapy Timed Treatment Charges  Manual therapy minutes (CPT 48967): 30 minutes  Therapeutic exercise minutes (CPT 45068): 14 minutes  ASSESSMENT:   Response to treatment: Excellent continued improvement from Deborah. Based on this, I updated POC to have increased emphasis on strengthening for upper quarter. She will continue to benefit from skilled PT intervention.     PLAN/RECOMMENDATIONS:   Plan for treatment: therapy treatment to continue next visit.  Planned interventions for next visit: continue with current treatment.         "

## 2025-03-14 ENCOUNTER — PHYSICAL THERAPY (OUTPATIENT)
Dept: PHYSICAL THERAPY | Facility: REHABILITATION | Age: OVER 89
End: 2025-03-14
Payer: MEDICARE

## 2025-03-14 DIAGNOSIS — M54.2 CERVICALGIA: ICD-10-CM

## 2025-03-14 DIAGNOSIS — M54.6 THORACIC SPINE PAIN: ICD-10-CM

## 2025-03-14 PROCEDURE — 97110 THERAPEUTIC EXERCISES: CPT | Performed by: PHYSICAL THERAPIST

## 2025-03-14 NOTE — OP THERAPY DAILY TREATMENT
"  Outpatient Physical Therapy  DAILY TREATMENT     [facility] Physical [address]  [address]  [place] [address]  Phone:  [phone]  Fax:  [phone]    Date: 03/14/2025    Patient: Deborah Odonnell  YOB: 1934  MRN: [id number]     Time Calculation    Start time: 0945  Stop time: 1029 Time Calculation (min): 44 minutes     Chief Complaint: Back Problem and Neck Problem    Visit #: 5    SUBJECTIVE:  Please see TX    OBJECTIVE:  Current objective measures: Please see TX  Neck Disability Total: 8  Oswestry Low Back Pain Disability Total Score: 18       Therapeutic Exercises (CPT 31492):     1. UBE w/u, 5' FWD level 2.5    2. HL T, pink TB 2x to fatigue    3. Thread the needle EOT, 1x to fatigue ea    4. straight arm pulldown, green TB x2 to fatigue    5. PB up the wall, x2 to fatigue,1\" holds    6. unilateral row with trunk rotation, green TB 2x to fatigue ea    7. scap protraction punch with trunk rotation, green TB 2x to fatigue ea    8. SNAG, x5 ea level      Therapeutic Exercise Summary: HEP: anterior and posterior scalene stretch, SL open book, shoulder rolls, row, bilateral external rotation    UPDATED HEP (3/14/2025): Cx SNAG rotation    Therapeutic Treatments and Modalities:     1. Manual Therapy (CPT 79222), see below    Therapeutic Treatment and Modalities Summary: Manual: MFR to thoracic and cervical paraspinals, cervical stretches, MFR to suboccipitals and SCM, gr 2 PA JMs T3-T8    Time-based treatments/modalities:    Physical Therapy Timed Treatment Charges  Therapeutic exercise minutes (CPT 85112): 44 minutes    ASSESSMENT:   Response to treatment: Please see TX    PLAN/RECOMMENDATIONS:   Plan for treatment: therapy treatment to continue next visit.  Planned interventions for next visit: continue with current treatment.         "

## 2025-03-14 NOTE — OP THERAPY PROGRESS SUMMARY
Outpatient Physical Therapy  PROGRESS SUMMARY NOTE      [facility] Physical Therapy [place] [address]  [address]  [place] [address]  Phone:  [phone]  Fax:  [phone]    Date of Visit: 03/14/2025    Patient: Deborah Odonnell  YOB: 1934  MRN: [id number]     Referring Provider: CONSTANZA Jean  [address] [place] [address]   Referring Diagnosis Low back pain, unspecified [M54.50];Cervicalgia [M54.2]     Visit Diagnoses     ICD-10-CM   1. Cervicalgia  M54.2   2. Thoracic spine pain  M54.6       Rehab Potential: good    Progress Report Period: 1/23/2025 - 3/14/2025    Functional Assessment Used  Neck Disability Total: 8  Oswestry Low Back Pain Disability Total Score: 18       Objective Findings and Assessment:   Patient progression towards goals: Deborah reports that her symptoms are improving. She feels like she can turn her head pretty well to the R now but it's difficult to turn to the L. Her upper back is improving as well.     She notices her symptoms most when needs to turn and look, especially while driving. This is especially true while backing up.    Objective findings and assessment details: ASSESSMENT  Deborah shows good overall improvement so far in physical therapy. She has gained improved cervical AROM and improvement to strength in her UEs, cervical and thoracic region. Along with this, Deborah reports subjective improvement that it has been easier to turn her head with less pain. She now feels that she is mostly limited in turning her head to the left, which still makes it difficult to scan her environment when walking or driving, especially backing up. She will continue to benefit from skilled PT intervention to address her deficits and restore full pain free function needed for safe walking and driving.     OBJECTIVE    Palpation   Left   Mildly hypertonic in the scalenes, sternocleidomastoid and upper trapezius.   Mild tenderness of the cervical  paraspinals, scalenes, sternocleidomastoid and upper trapezius.      Right   Mildly hypertonic in the scalenes, sternocleidomastoid and upper trapezius.   Mild tenderness of the cervical paraspinals, scalenes, sternocleidomastoid and upper trapezius.      Active Range of Motion      Cervical Spine   Flexion: Active cervical flexion: 45.  Extension: Active cervical extension: 45.  Retraction: decreased  Left lateral flexion: decreased 25  Right lateral flexion: Active right cervical lateral flexion: 25  Left rotation: decreased 52  Right rotation: Active right cervical rotation: 55.    Strength:    Cervical Spine   Deep neck flexors: 3+  Deep neck extensors: 3+     Left Shoulder   Planes of Motion   Flexion: 4  Extension: 4   Abduction: 4-   Adduction: 4   External rotation at 0°: 4-   Internal rotation at 0°: 4   Horizontal abduction: 3+  Isolated Muscles   Trapezius (lower): 3+  Trapezius (middle): 3+     Right Shoulder   Planes of Motion   Flexion: 4  Extension: 4   Abduction: 4-   Adduction: 4   External rotation at 0°: 4-   Internal rotation at 0°: 4   Horizontal abduction: 3+  Isolated Muscles   Trapezius (lower): 3+  Trapezius (middle): 3+     Additional Strength Details   strength R: 20#, L: 25#         Goals:   Short Term Goals:   1. Patient will demonstrate independent regular performance of tailored home exercise program in order to facilitate recovery and promote self treatment and patient ownership of recovery -- MET --  2. Patient will demo normal flexibility to bilateral SCM in order to enable improved Cx rotation AROM -- MET --  3. Patient will demo bilateral Cx rotation AROM >/= 55 deg to enable safe scanning of environment when ambulating -- IN PROGRESS, 52-55 DEG --  4. Patient will demo DNF strength >/= 4-/5 in order to facilitate improved cervicothoracic postural control throughout the day -- IN PROGRESS, 3+/5--    Short term goal time span:  2-4 weeks      Long Term Goals:    1. Patient will  demo bilateral shoulder rhomboid and mid trap strength >/= 4-/5 to facilitate improve scapular control and normalized scapulohumeral rhythm -- IN PROGRESS, 3+/5 --  2. Patient will report ability to read as usual for at least 1 week without discomfort in Cx and Tx spine above 3/10    Long term goal time span:  6-8 weeks    Plan:   Planned therapy interventions:  Neuromuscular Re-education (CPT 25454), Manual Therapy (CPT 51451), Therapeutic Exercise (CPT 66623) and Therapeutic Activities (CPT 61674)  Frequency:  2x week  Duration in weeks:  8      Referring provider co-signature:  I have reviewed this plan of care and my co-signature certifies the need for services.     Certification Period: 03/14/2025 to 04/11/25    Physician Signature: ________________________________ Date: ______________

## 2025-03-21 ENCOUNTER — PHYSICAL THERAPY (OUTPATIENT)
Dept: PHYSICAL THERAPY | Facility: REHABILITATION | Age: OVER 89
End: 2025-03-21
Payer: MEDICARE

## 2025-03-21 DIAGNOSIS — M54.6 THORACIC SPINE PAIN: ICD-10-CM

## 2025-03-21 DIAGNOSIS — G89.29 CHRONIC RIGHT SHOULDER PAIN: ICD-10-CM

## 2025-03-21 DIAGNOSIS — M54.2 CERVICALGIA: ICD-10-CM

## 2025-03-21 DIAGNOSIS — M25.511 CHRONIC RIGHT SHOULDER PAIN: ICD-10-CM

## 2025-03-21 PROCEDURE — 97110 THERAPEUTIC EXERCISES: CPT | Performed by: PHYSICAL THERAPIST

## 2025-03-21 PROCEDURE — 97140 MANUAL THERAPY 1/> REGIONS: CPT | Performed by: PHYSICAL THERAPIST

## 2025-03-21 NOTE — OP THERAPY DAILY TREATMENT
"  Outpatient Physical Therapy  DAILY TREATMENT     [facility] Physical [address].  [address]  [place] [address]  Phone:  [phone]  Fax:  [phone]    Date: 03/21/2025    Patient: Deborah Odonnell  YOB: 1934  MRN: [id number]     Time Calculation    Start time: 0945  Stop time: 1031 Time Calculation (min): 46 minutes     Chief Complaint: Back Problem and Neck Problem    Visit #: 6    SUBJECTIVE:  Deborah reports that she is continuing to feel improvement in physical therapy. She doesn't have any of the numbness in her face anymore, which she is very happy about.    OBJECTIVE:  Current objective measures: Min TTP and flexibility restriction in cervical paraspinals          Therapeutic Exercises (CPT 44467):     1. UBE w/u, 5' FWD level 2.5    2. HL T, pink TB 2x to fatigue, NT    4. straight arm pulldown, green TB x2 to fatigue, NT    5. PB up the wall, x2 to fatigue,1\" holds, NT    6. unilateral row with trunk rotation, green TB 2x to fatigue ea    7. scap protraction punch with trunk rotation, green TB 2x to fatigue ea    8. SNAG, x5 ea level, NT    9. thread the needle EOT, x15 ea    10. D2 flexion, orange TB 2x to fatigue    11. T, orange TB 2x to fatigue    12. pushup semi inclined on EOT, 2x to fatigue    13. seated OH lift c PB, 2x to fatigue      Therapeutic Exercise Summary: HEP: anterior and posterior scalene stretch, SL open book, shoulder rolls, row, bilateral external rotation    UPDATED HEP (3/14/2025): Cx SNAG rotation    Therapeutic Treatments and Modalities:     1. Manual Therapy (CPT 63721), see below    Therapeutic Treatment and Modalities Summary: Manual: MFR to thoracic and cervical paraspinals, cervical stretches, MFR to suboccipitals and SCM, gr 2 PA JMs T3-T8    Time-based treatments/modalities:    Physical Therapy Timed Treatment Charges  Manual therapy minutes (CPT 63700): 15 minutes  Therapeutic exercise minutes (CPT 46669): 31 " minutes    ASSESSMENT:   Response to treatment: Deborah is demonstrating excellent improvement in physical therapy, evidenced by report of subjective decrease in pain and resolution of paresthesia into her face. Began progression of functional strengthening in order to support these improvement. Good response during today's session with fatigue but no increase in pain.    PLAN/RECOMMENDATIONS:   Plan for treatment: therapy treatment to continue next visit.  Planned interventions for next visit: continue with current treatment.

## 2025-04-04 ENCOUNTER — PHYSICAL THERAPY (OUTPATIENT)
Dept: PHYSICAL THERAPY | Facility: REHABILITATION | Age: OVER 89
End: 2025-04-04
Payer: MEDICARE

## 2025-04-04 DIAGNOSIS — M54.6 THORACIC SPINE PAIN: ICD-10-CM

## 2025-04-04 DIAGNOSIS — M54.2 CERVICALGIA: ICD-10-CM

## 2025-04-04 PROCEDURE — 97110 THERAPEUTIC EXERCISES: CPT | Performed by: PHYSICAL THERAPIST

## 2025-04-04 PROCEDURE — 97010 HOT OR COLD PACKS THERAPY: CPT | Performed by: PHYSICAL THERAPIST

## 2025-04-04 NOTE — OP THERAPY DAILY TREATMENT
"  Outpatient Physical Therapy  DAILY TREATMENT     [facility] Physical [address]  [address]  [place] [address]  Phone:  [phone]  Fax:  [phone]    Date: 04/04/2025    Patient: Deborah Odonnell  YOB: 1934  MRN: [id number]     Time Calculation    Start time: 0945  Stop time: 1038 Time Calculation (min): 53 minutes     Chief Complaint: Neck Problem and Back Problem    Visit #: 7    SUBJECTIVE:  Deborah reports that she feels a little stiff this morning, possible either because of the colder weather or because she went harder on her exercises yesterday, as she did 10 second holds instead of 5 sec.    OBJECTIVE:  Current objective measures: NT          Therapeutic Exercises (CPT 30135):     1. UBE w/u, 5' FWD level 2.5    4. straight arm pulldown, green TB x2 to fatigue    5. PB up the wall, x2 to fatigue,1\" holds, NT    6. unilateral row with trunk rotation, green TB 2x to fatigue ea    7. scap protraction punch with trunk rotation, green TB 2x to fatigue ea    9. windmill, orange TB 2x to fatigue    10. D2 flexion, orange TB 2x to fatigue    11. T, pink TB 2x to fatigue    12. pushup semi inclined on EOT, 2x to fatigue    13. seated OH lift c PB, 2x to fatigue, NT    15. scaption, 1# DB to fatigue    16. abduction, 1# DB to fatigue    17. body blade, push/pull, IR/ER to fatigue      Therapeutic Exercise Summary: HEP: anterior and posterior scalene stretch, SL open book, shoulder rolls, row, bilateral external rotation    UPDATED HEP (3/14/2025): Cx SNAG rotation    Therapeutic Treatments and Modalities:     1. Manual Therapy (CPT 14351), see below, NT    2. Hot or Cold Pack Therapy (CPT 67087), 10' MHP 8 towel layers, bell and skin check for safety, provided end of session for comfort due to higher level of strengthening today and trying to wean from reliance on manual PT    Therapeutic Treatment and Modalities Summary: Manual: MFR to thoracic and cervical paraspinals, " cervical stretches, MFR to suboccipitals and SCM, gr 2 PA JMs T3-T8    Time-based treatments/modalities:    Physical Therapy Timed Treatment Charges  Therapeutic exercise minutes (CPT 85627): 43 minutes    ASSESSMENT:   Response to treatment: Deborah continues to demonstrate excellent improvement in physical therapy. I increased functional strengthening for Cx and Tx and UE musculature which she responded well to with mild fatigue but no discomfort. Deborah will continue to benefit from skilled PT, targetting next MT for potential DC.    PLAN/RECOMMENDATIONS:   Plan for treatment: therapy treatment to continue next visit.  Planned interventions for next visit: continue with current treatment.

## 2025-04-11 ENCOUNTER — PHYSICAL THERAPY (OUTPATIENT)
Dept: PHYSICAL THERAPY | Facility: REHABILITATION | Age: OVER 89
End: 2025-04-11
Payer: MEDICARE

## 2025-04-11 DIAGNOSIS — M54.2 CERVICALGIA: ICD-10-CM

## 2025-04-11 DIAGNOSIS — M54.6 THORACIC SPINE PAIN: ICD-10-CM

## 2025-04-11 PROCEDURE — 97110 THERAPEUTIC EXERCISES: CPT | Performed by: PHYSICAL THERAPIST

## 2025-04-11 PROCEDURE — 97140 MANUAL THERAPY 1/> REGIONS: CPT | Performed by: PHYSICAL THERAPIST

## 2025-04-11 NOTE — OP THERAPY DAILY TREATMENT
"  Outpatient Physical Therapy  DAILY TREATMENT     [facility] Physical [address].  [address]  [place] [address]  Phone:  [phone]  Fax:  [phone]    Date: 04/11/2025    Patient: Deborah Odonnell  YOB: 1934  MRN: [id number]     Time Calculation    Start time: 1030  Stop time: 1115 Time Calculation (min): 45 minutes     Chief Complaint: Back Problem    Visit #: 8    SUBJECTIVE:  Deborah reports that her neck is feeling more stiff this morning. Overall she is still feeling improvement.    OBJECTIVE:  Current objective measures: Moderate inflexibility and TTP           Therapeutic Exercises (CPT 26548):     1. UBE w/u, 5' FWD level 2.5    4. straight arm pulldown, green TB x2 to fatigue    5. PB up the wall, x2 to fatigue,1\" holds, NT    6. unilateral row with trunk rotation, green TB 2x to fatigue ea    7. scap protraction punch with trunk rotation, green TB 2x to fatigue ea    9. windmill, orange TB 2x to fatigue    10. D2 flexion, orange TB 2x to fatigue    11. T, pink TB 2x to fatigue    12. pushup semi inclined on EOT, 2x to fatigue    13. seated OH lift c PB, 2x to fatigue, NT    15. scaption, 1# DB to fatigue    16. abduction, 1# DB to fatigue    17. body blade, push/pull, IR/ER to fatigue      Therapeutic Exercise Summary: HEP: anterior and posterior scalene stretch, SL open book, shoulder rolls, row, bilateral external rotation    UPDATED HEP (3/14/2025): Cx SNAG rotation    Therapeutic Treatments and Modalities:     1. Manual Therapy (CPT 76199), see below, NT    2. Hot or Cold Pack Therapy (CPT 64072), 10' MHP 8 towel layers, bell and skin check for safety, provided end of session for comfort due to higher level of strengthening today and trying to wean from reliance on manual PT    Therapeutic Treatment and Modalities Summary: Manual: MFR to thoracic and cervical paraspinals, cervical stretches, MFR to suboccipitals and SCM, gr 2 PA JMs T3-T8    Time-based " treatments/modalities:    Physical Therapy Timed Treatment Charges  Manual therapy minutes (CPT 92143): 20 minutes  Therapeutic exercise minutes (CPT 91113): 25 minutes    ASSESSMENT:   Response to treatment: Modified POC based on patient report of increased discomfort in the L side of her neck. She reports with hypertonicity primarily in L SCM. I utilized light ROM and joint mobility exercises to emphasize benefits of manual therapy. She will continue to benefit from skilled PT.    PLAN/RECOMMENDATIONS:   Plan for treatment: therapy treatment to continue next visit.  Planned interventions for next visit: continue with current treatment.

## 2025-04-18 ENCOUNTER — PHYSICAL THERAPY (OUTPATIENT)
Dept: PHYSICAL THERAPY | Facility: REHABILITATION | Age: OVER 89
End: 2025-04-18
Payer: MEDICARE

## 2025-04-18 DIAGNOSIS — M54.6 THORACIC SPINE PAIN: ICD-10-CM

## 2025-04-18 DIAGNOSIS — G89.29 CHRONIC RIGHT SHOULDER PAIN: ICD-10-CM

## 2025-04-18 DIAGNOSIS — M25.511 CHRONIC RIGHT SHOULDER PAIN: ICD-10-CM

## 2025-04-18 PROCEDURE — 97140 MANUAL THERAPY 1/> REGIONS: CPT | Performed by: PHYSICAL THERAPIST

## 2025-04-18 PROCEDURE — 97110 THERAPEUTIC EXERCISES: CPT | Performed by: PHYSICAL THERAPIST

## 2025-04-18 PROCEDURE — 97010 HOT OR COLD PACKS THERAPY: CPT | Performed by: PHYSICAL THERAPIST

## 2025-04-18 NOTE — OP THERAPY DAILY TREATMENT
"  Outpatient Physical Therapy  DAILY TREATMENT     [facility] Physical [address].  [address]  [place] [address]  Phone:  [phone]  Fax:  [phone]    Date: 04/18/2025    Patient: Deborah Odonnell  YOB: 1934  MRN: [id number]     Time Calculation    Start time: 1115  Stop time: 1210 Time Calculation (min): 55 minutes     Chief Complaint: Back Problem and Shoulder Problem    Visit #: 9    SUBJECTIVE:  Deborah reports continued improvement and agrees that she is likely approaching readiness for discharge in the next 2-3 visits.    OBJECTIVE:  Current objective measures: NT          Therapeutic Exercises (CPT 51751):     1. UBE w/u, 5' FWD level 2.5    4. straight arm pulldown, green TB x2 to fatigue    5. PB up the wall, x2 to fatigue,1\" holds, NT    6. unilateral row with trunk rotation, green TB 2x to fatigue ea    7. scap protraction punch with trunk rotation, green TB 2x to fatigue ea    9. windmill, orange TB 2x to fatigue    10. D2 flexion, orange TB 2x to fatigue    11. T, pink TB 2x to fatigue    12. pushup semi inclined on EOT, 2x to fatigue    13. seated OH lift c PB, 2x to fatigue, NT    15. scaption, 1# DB to fatigue    16. abduction, 1# DB to fatigue    17. body blade, push/pull, IR/ER to fatigue    19. red wate bar lift, 2x to fatigue      Therapeutic Exercise Summary: HEP: anterior and posterior scalene stretch, SL open book, shoulder rolls, row, bilateral external rotation    UPDATED HEP (3/14/2025): Cx SNAG rotation    Therapeutic Treatments and Modalities:     1. Manual Therapy (CPT 47578), see below, NT    2. Hot or Cold Pack Therapy (CPT 71196), 10' MHP 8 towel layers, bell and skin check for safety, provided end of session for comfort due to higher level of strengthening today and trying to wean from reliance on manual PT    Therapeutic Treatment and Modalities Summary: Manual: MFR to thoracic and cervical paraspinals, cervical stretches, MFR to " suboccipitals and SCM, gr 2 PA Vikki T3-T8    Time-based treatments/modalities:    Physical Therapy Timed Treatment Charges  Manual therapy minutes (CPT 57884): 25 minutes  Therapeutic exercise minutes (CPT 43989): 20 minutes    ASSESSMENT:   Response to treatment: Deborah is showing good improvement over time and is approaching readiness for discharge I recommend that we continue to push her in functional strengthening and postural control over the next 1-2 sessions and test her ability to handle strengthening progression in preparation for upcoming DC.    PLAN/RECOMMENDATIONS:   Plan for treatment: therapy treatment to continue next visit.  Planned interventions for next visit: continue with current treatment.

## 2025-05-02 ENCOUNTER — PHYSICAL THERAPY (OUTPATIENT)
Dept: PHYSICAL THERAPY | Facility: REHABILITATION | Age: OVER 89
End: 2025-05-02
Payer: MEDICARE

## 2025-05-02 DIAGNOSIS — M54.6 THORACIC SPINE PAIN: ICD-10-CM

## 2025-05-02 DIAGNOSIS — M54.2 CERVICALGIA: ICD-10-CM

## 2025-05-02 PROCEDURE — 97140 MANUAL THERAPY 1/> REGIONS: CPT | Performed by: PHYSICAL THERAPIST

## 2025-05-02 PROCEDURE — 97110 THERAPEUTIC EXERCISES: CPT | Performed by: PHYSICAL THERAPIST

## 2025-05-02 NOTE — OP THERAPY DISCHARGE SUMMARY
Outpatient Physical Therapy  DISCHARGE SUMMARY NOTE      [facility] Physical [address]  [address]  [place] [address]  Phone:  [phone]  Fax:  [phone]    Date of Visit: 05/02/2025    Patient: Deborah Odonnell  YOB: 1934  MRN: [id number]     Referring Provider: CONSTANZA Herr  [address] [place] [address]   Referring Diagnosis Low back pain, unspecified [M54.50];Cervicalgia [M54.2]         Functional Assessment Used        Your patient is being discharged from Physical Therapy with the following comments:   Goals met    Comments:  SUBJECTIVE:  Deborah reports that overall she feels significant improvement in her neck. She does still occasionally feel tightness or limitation but she doesn't really have any pain now. She feels like she has full function right now. She agrees that she is ready for discharge and is willing to continue updated HEP indefinitely.      OBJECTIVE    Palpation: WFL     Active Range of Motion      Cervical Spine   Flexion: Active cervical flexion: 45.  Extension: Active cervical extension: 45.  Retraction: decreased  Left lateral flexion: decreased 27  Right lateral flexion: Active right cervical lateral flexion: 27  Left rotation: 67 deg  Right rotation: Active right cervical rotation: 65 deg.     Strength:    Cervical Spine   Deep neck flexors: 4-  Deep neck extensors: 4-     Left Shoulder   Planes of Motion   Flexion: 4+  Extension: 4+  Abduction: 4  Adduction: 4   External rotation at 0°: 4  Internal rotation at 0°: 4   Horizontal abduction: 4-  Isolated Muscles   Trapezius (lower): 4-  Trapezius (middle): 4-     Right Shoulder   Planes of Motion   Flexion: 4+  Extension: 4+  Abduction: 4  Adduction: 4   External rotation at 0°: 4  Internal rotation at 0°: 4   Horizontal abduction: 4-  Isolated Muscles   Trapezius (lower): 4-  Trapezius (middle): 4-     Additional Strength Details   strength R: 32#, L: 26#     Limitations  Remaining:  Goals:   Short Term Goals:   1. Patient will demonstrate independent regular performance of tailored home exercise program in order to facilitate recovery and promote self treatment and patient ownership of recovery -- MET --  2. Patient will demo normal flexibility to bilateral SCM in order to enable improved Cx rotation AROM -- MET --  3. Patient will demo bilateral Cx rotation AROM >/= 55 deg to enable safe scanning of environment when ambulating -- MET --  4. Patient will demo DNF strength >/= 4-/5 in order to facilitate improved cervicothoracic postural control throughout the day -- MET --     Short term goal time span:  2-4 weeks      Long Term Goals:    1. Patient will demo bilateral shoulder rhomboid and mid trap strength >/= 4-/5 to facilitate improve scapular control and normalized scapulohumeral rhythm -- MET --  2. Patient will report ability to read as usual for at least 1 week without discomfort in Cx and Tx spine above 3/10 -- MET --     Long term goal time span:  6-8 weeks    Recommendations:  Deborah has demonstrated excellent improvement in PT, including Cx rotation increased from 43-50 to 65-67, respectively. Deborah has also gained a significant strength in UE, scapular, and shoulder musculature. She subjectively reports full function and no pain. Along with this, she has good understanding of pathology and willingness to continue self treatment and keep up with her finalized HEP. She is ready for discharge at this time.    Jac Pitts, PT, DPT    Date: 5/2/2025

## 2025-05-02 NOTE — OP THERAPY DAILY TREATMENT
"  Outpatient Physical Therapy  DAILY TREATMENT     [facility] Physical [address].  [address]  [place] [address]  Phone:  [phone]  Fax:  [phone]    Date: 2025    Patient: Deborah Odonnell  YOB: 1934  MRN: [id number]     Time Calculation    Start time: 1445  Stop time: 1529 Time Calculation (min): 44 minutes     Chief Complaint: Back Problem and Neck Problem    Visit #: 10    SUBJECTIVE:  Please see DC    OBJECTIVE:  Current objective measures: Please see DC          Therapeutic Exercises (CPT 24173):     1. UBE w/u, 5' FWD level 2.5    4. straight arm pulldown, green TB x2 to fatigue, NT    5. PB up the wall, x2 to fatigue,1\" holds, NT    6. unilateral row with trunk rotation, green TB 2x to fatigue ea, NT    7. scap protraction punch with trunk rotation, green TB 2x to fatigue ea, NT    11. T, pink TB 2x to fatigue    12. pushup semi inclined on EOT, 2x to fatigue    13. seated OH lift c PB, 2x to fatigue, NT    15. scaption, 1# DB to fatigue    16. abduction, 1# DB to fatigue    17. squat    18. D2 flexion    19. red wate bar lift, 2x to fatigue    20. Cx SNAG      Therapeutic Exercise Summary: Access Code: [email or web address]  URL: [email or web address]  Date: 2025  Prepared by: Jac Pitts    Exercises  - Seated Scalene Stretch with Towel  - 1 x daily - 3-5 x weekly - 2 min hold  - Plank with Thoracic Rotation on Counter  - 1 x daily - 3-5 x weekly  - Seated Assisted Cervical Rotation with Towel  - 1 x daily - 3-5 x weekly  - Standing Shoulder Row with Anchored Resistance  - 1 x daily - 3-5 x weekly - 2-3 sets  - Scaption with Dumbbells  - 1 x daily - 3-5 x weekly - 2-3 sets  - Shoulder PNF D2 with Resistance  - 1 x daily - 3-5 x weekly - 2-3 sets  - Squat  - 1 x daily - 3-5 x weekly - 2-3 sets    Therapeutic Treatments and Modalities:     1. Manual Therapy (CPT 14500), see below    2. Hot or Cold Pack Therapy (CPT 61092), 10' MHP 8 towel layers, bell and " skin check for safety, NT    Therapeutic Treatment and Modalities Summary: Manual: MFR to thoracic and cervical paraspinals, cervical stretches, MFR to suboccipitals and SCM, gr 2 PA JMs T3-T8    Time-based treatments/modalities:    Physical Therapy Timed Treatment Charges  Manual therapy minutes (CPT 96044): 10 minutes  Therapeutic exercise minutes (CPT 29011): 34 minutes    ASSESSMENT:   Response to treatment: Please see DC    PLAN/RECOMMENDATIONS:   Plan for treatment: therapy treatment to continue next visit.  Planned interventions for next visit: continue with current treatment.

## 2025-05-06 ENCOUNTER — APPOINTMENT (OUTPATIENT)
Dept: PHYSICAL THERAPY | Facility: REHABILITATION | Age: OVER 89
End: 2025-05-06
Payer: MEDICARE

## 2025-05-12 ENCOUNTER — APPOINTMENT (OUTPATIENT)
Dept: PHYSICAL THERAPY | Facility: REHABILITATION | Age: OVER 89
End: 2025-05-12
Payer: MEDICARE

## 2025-05-15 ENCOUNTER — APPOINTMENT (OUTPATIENT)
Dept: PHYSICAL THERAPY | Facility: REHABILITATION | Age: OVER 89
End: 2025-05-15
Payer: MEDICARE

## 2025-05-20 ENCOUNTER — APPOINTMENT (OUTPATIENT)
Dept: PHYSICAL THERAPY | Facility: REHABILITATION | Age: OVER 89
End: 2025-05-20
Payer: MEDICARE

## 2025-05-22 ENCOUNTER — APPOINTMENT (OUTPATIENT)
Dept: PHYSICAL THERAPY | Facility: REHABILITATION | Age: OVER 89
End: 2025-05-22
Payer: MEDICARE

## 2025-05-27 ENCOUNTER — APPOINTMENT (OUTPATIENT)
Dept: PHYSICAL THERAPY | Facility: REHABILITATION | Age: OVER 89
End: 2025-05-27
Payer: MEDICARE

## 2025-05-29 ENCOUNTER — APPOINTMENT (OUTPATIENT)
Dept: PHYSICAL THERAPY | Facility: REHABILITATION | Age: OVER 89
End: 2025-05-29
Payer: MEDICARE

## 2025-06-03 ENCOUNTER — APPOINTMENT (OUTPATIENT)
Dept: PHYSICAL THERAPY | Facility: REHABILITATION | Age: OVER 89
End: 2025-06-03
Payer: MEDICARE

## 2025-06-05 ENCOUNTER — APPOINTMENT (OUTPATIENT)
Dept: PHYSICAL THERAPY | Facility: REHABILITATION | Age: OVER 89
End: 2025-06-05
Payer: MEDICARE